# Patient Record
Sex: MALE | Race: WHITE | NOT HISPANIC OR LATINO | Employment: UNEMPLOYED | URBAN - METROPOLITAN AREA
[De-identification: names, ages, dates, MRNs, and addresses within clinical notes are randomized per-mention and may not be internally consistent; named-entity substitution may affect disease eponyms.]

---

## 2020-11-24 ENCOUNTER — LAB VISIT (OUTPATIENT)
Dept: PRIMARY CARE CLINIC | Facility: OTHER | Age: 55
End: 2020-11-24
Attending: INTERNAL MEDICINE
Payer: OTHER GOVERNMENT

## 2020-11-24 DIAGNOSIS — Z03.818 ENCOUNTER FOR OBSERVATION FOR SUSPECTED EXPOSURE TO OTHER BIOLOGICAL AGENTS RULED OUT: ICD-10-CM

## 2020-11-24 PROCEDURE — U0003 INFECTIOUS AGENT DETECTION BY NUCLEIC ACID (DNA OR RNA); SEVERE ACUTE RESPIRATORY SYNDROME CORONAVIRUS 2 (SARS-COV-2) (CORONAVIRUS DISEASE [COVID-19]), AMPLIFIED PROBE TECHNIQUE, MAKING USE OF HIGH THROUGHPUT TECHNOLOGIES AS DESCRIBED BY CMS-2020-01-R: HCPCS

## 2020-11-26 LAB — SARS-COV-2 RNA RESP QL NAA+PROBE: NOT DETECTED

## 2022-06-10 ENCOUNTER — HOSPITAL ENCOUNTER (EMERGENCY)
Facility: HOSPITAL | Age: 57
Discharge: HOME | End: 2022-06-10
Attending: EMERGENCY MEDICINE

## 2022-06-10 VITALS
SYSTOLIC BLOOD PRESSURE: 113 MMHG | BODY MASS INDEX: 24.25 KG/M2 | RESPIRATION RATE: 16 BRPM | DIASTOLIC BLOOD PRESSURE: 77 MMHG | HEART RATE: 59 BPM | OXYGEN SATURATION: 97 % | HEIGHT: 75 IN | WEIGHT: 195 LBS | TEMPERATURE: 98 F

## 2022-06-10 DIAGNOSIS — T16.2XXA FOREIGN BODY OF LEFT EAR, INITIAL ENCOUNTER: Primary | ICD-10-CM

## 2022-06-10 PROCEDURE — 99282 EMERGENCY DEPT VISIT SF MDM: CPT

## 2022-06-10 PROCEDURE — 99282 EMERGENCY DEPT VISIT SF MDM: CPT | Mod: 25,,, | Performed by: EMERGENCY MEDICINE

## 2022-06-10 PROCEDURE — 99282 PR EMERGENCY DEPT VISIT,LEVEL II: ICD-10-PCS | Mod: 25,,, | Performed by: EMERGENCY MEDICINE

## 2022-06-10 PROCEDURE — 69200 CLEAR OUTER EAR CANAL: CPT | Mod: LT,,, | Performed by: EMERGENCY MEDICINE

## 2022-06-10 PROCEDURE — 69200 PR REMV EXT CANAL FOREIGN BODY: ICD-10-PCS | Mod: LT,,, | Performed by: EMERGENCY MEDICINE

## 2022-06-10 NOTE — ED PROVIDER NOTES
Encounter Date: 6/10/2022       History     Chief Complaint   Patient presents with    Otalgia     Pt states a rubber piece from an ear phone is in his left ear; denies any other complaints     57-year-old male presents to the ED for foreign body in left ear.  Patient has a over piece from his headphones stuck in his left ear for 3 days.  Reports trying to get it out with a nail but has been unsuccessful.  Patient denies any changes in hearing, drainage, fevers/chills.        Review of patient's allergies indicates:  No Known Allergies  No past medical history on file.  No past surgical history on file.  No family history on file.     Review of Systems   Constitutional: Negative for chills and fever.   HENT: Negative for sore throat.    Eyes: Negative for pain.   Respiratory: Negative for shortness of breath.    Cardiovascular: Negative for chest pain.   Gastrointestinal: Negative for abdominal pain.   Genitourinary: Negative for difficulty urinating and dysuria.   Musculoskeletal: Negative.    Skin: Negative.    Neurological: Negative for weakness.   Psychiatric/Behavioral: Negative for confusion.       Physical Exam     Initial Vitals [06/10/22 0928]   BP Pulse Resp Temp SpO2   113/77 (!) 59 16 97.9 °F (36.6 °C) 97 %      MAP       --         Physical Exam    Nursing note and vitals reviewed.  Constitutional: He appears well-developed and well-nourished.   HENT:   Head: Normocephalic and atraumatic.   Right Ear: External ear normal.   soft rubber ear piece in the left ear.  Easily removed with tweezers.  TM unremarkable   Eyes: Conjunctivae are normal. Pupils are equal, round, and reactive to light.   Neck: Neck supple.   Normal range of motion.  Cardiovascular: Normal rate, regular rhythm, normal heart sounds and intact distal pulses. Exam reveals no gallop and no friction rub.    No murmur heard.  Pulmonary/Chest: Breath sounds normal.   Abdominal: Abdomen is soft. Bowel sounds are normal. There is no abdominal  tenderness.   Musculoskeletal:         General: Normal range of motion.      Cervical back: Normal range of motion and neck supple.     Neurological: He is alert and oriented to person, place, and time. GCS score is 15. GCS eye subscore is 4. GCS verbal subscore is 5. GCS motor subscore is 6.   Skin: Skin is warm. Capillary refill takes less than 2 seconds.   Psychiatric: He has a normal mood and affect.         ED Course   Procedures  Labs Reviewed - No data to display       Imaging Results    None          Medications - No data to display  Medical Decision Making:   History:   Old Medical Records: I decided to obtain old medical records.       APC / Resident Notes:   57-year-old male presents to the ED for a foreign body in his left ear.  Has a soft piece of rubber from a headphone that was visible next turn or ear canal which was easily removed with tweezers.  The underlying TM unremarkable.  No changes in hearing.  No signs of otitis externa.  Patient is nontoxic appearing with reassuring vitals.                 Clinical Impression:   Final diagnoses:  [T16.2XXA] Foreign body of left ear, initial encounter (Primary)          ED Disposition Condition    Discharge Stable        ED Prescriptions     None        Follow-up Information    None          Sofía Castellanos PA-C  06/10/22 1012

## 2022-06-10 NOTE — ED NOTES
LOC: The patient is awake and alert; oriented x 3 and speaking appropriately.  APPEARANCE: Patient resting comfortably, patient is clean and well groomed  SKIN: warm and dry, normal skin turgor & moist mucus membranes, skin intact, no breakdown noted.  MUSCULOSKELETAL: Patient moving all extremities well, no obvious swelling or deformities noted  RESPIRATORY: Airway is open and patent,; respirations are spontaneous, normal effort and rate  CARDIAC: Patient has a normal rate, no peripheral edema noted, capillary refill < 3 seconds; No complaints of chest pain   ABDOMEN: Soft and non tender to palpation, no distention noted.

## 2022-06-10 NOTE — DISCHARGE INSTRUCTIONS
You were seen in the ED for foreign body in her left ear.  This was removed in the ED.  you may follow-up with your PCP for any other complaints.

## 2022-06-22 ENCOUNTER — HOSPITAL ENCOUNTER (EMERGENCY)
Facility: HOSPITAL | Age: 57
Discharge: HOME | End: 2022-06-22
Attending: EMERGENCY MEDICINE

## 2022-06-22 VITALS
HEIGHT: 75 IN | RESPIRATION RATE: 20 BRPM | SYSTOLIC BLOOD PRESSURE: 130 MMHG | DIASTOLIC BLOOD PRESSURE: 93 MMHG | BODY MASS INDEX: 23.87 KG/M2 | HEART RATE: 85 BPM | TEMPERATURE: 98 F | WEIGHT: 192 LBS

## 2022-06-22 DIAGNOSIS — L23.89 ALLERGIC CONTACT DERMATITIS DUE TO OTHER AGENTS: Primary | ICD-10-CM

## 2022-06-22 DIAGNOSIS — L03.317 CELLULITIS OF RIGHT BUTTOCK: ICD-10-CM

## 2022-06-22 PROCEDURE — 99284 EMERGENCY DEPT VISIT MOD MDM: CPT | Mod: ,,, | Performed by: PHYSICIAN ASSISTANT

## 2022-06-22 PROCEDURE — 99284 PR EMERGENCY DEPT VISIT,LEVEL IV: ICD-10-PCS | Mod: ,,, | Performed by: PHYSICIAN ASSISTANT

## 2022-06-22 PROCEDURE — 99283 EMERGENCY DEPT VISIT LOW MDM: CPT

## 2022-06-22 RX ORDER — CLINDAMYCIN HYDROCHLORIDE 300 MG/1
300 CAPSULE ORAL EVERY 6 HOURS
Qty: 28 CAPSULE | Refills: 0 | Status: SHIPPED | OUTPATIENT
Start: 2022-06-22 | End: 2022-06-29

## 2022-06-22 NOTE — DISCHARGE INSTRUCTIONS
Take clindamycin as prescribed to completion.  Monitor your rash closely.  Follow-up with primary care and Dermatology or return to the emergency department sooner for any new or worsening symptoms.

## 2022-06-22 NOTE — ED PROVIDER NOTES
Encounter Date: 6/22/2022       History     Chief Complaint   Patient presents with    Rash     R buttock and L arm     57-year-old male with past medical history of hypertension presents to the emergency department with chief complaint of rash noted to the left hand and right buttock. He reports this has been intermittent for the last two years, but has recently flared for the last few days. He does admit to using a new soap prior to onset of symptoms. Denies fever, recent illness, recent travel. Denies cp, sob, NVD. He reports trying fungal foot cream in the past with resolution of symptoms, however this has not worked with this flare up. He denies other worsening or alleviating factors.         Review of patient's allergies indicates:  No Known Allergies  Past Medical History:   Diagnosis Date    Arthritis     Hypertension      No past surgical history on file.  No family history on file.  Social History     Tobacco Use    Smoking status: Current Every Day Smoker     Types: Cigarettes   Substance Use Topics    Alcohol use: Yes    Drug use: Yes     Types: Marijuana     Review of Systems   Constitutional: Negative for fever.   HENT: Negative for sore throat.    Respiratory: Negative for shortness of breath.    Cardiovascular: Negative for chest pain.   Gastrointestinal: Negative for nausea.   Genitourinary: Negative for dysuria.   Musculoskeletal: Negative for back pain.   Skin: Positive for rash.   Neurological: Negative for weakness.   Hematological: Does not bruise/bleed easily.       Physical Exam     Initial Vitals [06/22/22 1515]   BP Pulse Resp Temp SpO2   (!) 130/93 85 20 97.9 °F (36.6 °C) --      MAP       --         Physical Exam    Nursing note and vitals reviewed.  Constitutional: He appears well-developed and well-nourished. He is not diaphoretic. No distress.   HENT:   Head: Normocephalic and atraumatic.   Mouth/Throat: Oropharynx is clear and moist.   Eyes: EOM are normal. Pupils are equal,  round, and reactive to light.   Neck: Neck supple.   Normal range of motion.  Cardiovascular: Normal rate, regular rhythm, normal heart sounds and intact distal pulses. Exam reveals no gallop and no friction rub.    No murmur heard.  Pulmonary/Chest: Breath sounds normal. He has no wheezes. He has no rhonchi. He has no rales.   Abdominal: Abdomen is soft. Bowel sounds are normal. There is no abdominal tenderness.   Musculoskeletal:         General: Normal range of motion.      Cervical back: Normal range of motion and neck supple.     Neurological: He is alert and oriented to person, place, and time. He has normal strength. GCS score is 15. GCS eye subscore is 4. GCS verbal subscore is 5. GCS motor subscore is 6.   Skin: Skin is warm and dry. Capillary refill takes less than 2 seconds.   Papular rash noted to the lateral, dorsal aspect to the left hand with some scaling and excoriations.   Diffuse maculopapular erythematous rash located to the right gluteal region. Appears cellulitic. Does not involve the scrotum or perineum.    Psychiatric: He has a normal mood and affect. His behavior is normal. Judgment and thought content normal.         ED Course   Procedures  Labs Reviewed - No data to display       Imaging Results    None          Medications - No data to display  Medical Decision Making:   History:   Old Medical Records: I decided to obtain old medical records.  Initial Assessment:   Emergent evaluation of a 57-year-old male who presents to the emergency department with chief complaint of rash noted to the left hand and right buttock.  Admits to intermittent rashes this nature for the last 2 years.  He does report using a new soap prior to this flare up.  Denies fever or chills.  No recent illness.  Patient is afebrile, hemodynamically stable, nontoxic appearing.  Differential Diagnosis:   Differential diagnosis includes but is not limited to contact dermatitis, cellulitis, monkey pox, herpes zoster.  ED  Management:  Suspect symptoms may be related to contact dermatitis with patient's new soap.  However, he appears to have a superimposed bacterial infection to the right buttock.  Will treat with clindamycin as patient does report that he has had MRSA infections in the past.  Extensive return precautions discussed.  Patient voices understanding.  All questions answered.  The patient was instructed to follow up with dermatology or to return to the emergency department for worsening symptoms. The treatment plan was discussed with the patient who demonstrated understanding and comfort with plan. The patient's history, physical exam, and plan of care was discussed with and agreed upon with my supervising physician.                         Clinical Impression:   Final diagnoses:  [L23.89] Allergic contact dermatitis due to other agents (Primary)  [L03.317] Cellulitis of right buttock          ED Disposition Condition    Discharge Stable        ED Prescriptions     Medication Sig Dispense Start Date End Date Auth. Provider    clindamycin (CLEOCIN) 300 MG capsule Take 1 capsule (300 mg total) by mouth every 6 (six) hours. for 7 days 28 capsule 6/22/2022 6/29/2022 Mel Mcgovern PA-C        Follow-up Information     Follow up With Specialties Details Why Contact Info Additional Information    [facility] - Emergency Dept Emergency Medicine Go to  If symptoms worsen [address] [facility] [id number] [phone]     [facility] - Dermatology [address] [place] [address] Dermatology Schedule an appointment as soon as possible for a visit in 1 week  [address] [facility] [id number] [phone] Dermatology - Main Building, Clinic 11th Floor Please park in [facility]. Use Clinic elevators 12 & 13 to get to the 11th floor    [facility] - Allergy & Dermatology Allergy, Dermatology Schedule an appointment as soon as possible for a visit in 1 week  [address] [facility]  [address]  [phone]       [facility] Int Med Primary Care Bldg Internal Medicine Schedule an appointment as soon as possible for a visit in 1 week  [address] [facility]  [facility] [phone] [facility] Please park in surface lot and check in at central registration desk           Mel Mcgovern PA-C  06/22/22 8468

## 2022-12-27 ENCOUNTER — HOSPITAL ENCOUNTER (OUTPATIENT)
Facility: HOSPITAL | Age: 57
Discharge: LEFT BEFORE DISCHARGE | End: 2022-12-27
Attending: EMERGENCY MEDICINE | Admitting: EMERGENCY MEDICINE
Payer: MEDICAID

## 2022-12-27 VITALS
SYSTOLIC BLOOD PRESSURE: 180 MMHG | RESPIRATION RATE: 18 BRPM | HEART RATE: 67 BPM | OXYGEN SATURATION: 98 % | TEMPERATURE: 98 F | DIASTOLIC BLOOD PRESSURE: 85 MMHG

## 2022-12-27 DIAGNOSIS — T40.604A OPIATE OVERDOSE, UNDETERMINED INTENT, INITIAL ENCOUNTER: Primary | ICD-10-CM

## 2022-12-27 DIAGNOSIS — T68.XXXA HYPOTHERMIA, INITIAL ENCOUNTER: ICD-10-CM

## 2022-12-27 DIAGNOSIS — R00.1 BRADYCARDIA: ICD-10-CM

## 2022-12-27 LAB
ALBUMIN SERPL BCP-MCNC: 3.8 G/DL (ref 3.5–5.2)
ALP SERPL-CCNC: 92 U/L (ref 55–135)
ALT SERPL W/O P-5'-P-CCNC: 34 U/L (ref 10–44)
AMPHET+METHAMPHET UR QL: ABNORMAL
ANION GAP SERPL CALC-SCNC: 13 MMOL/L (ref 8–16)
AST SERPL-CCNC: 37 U/L (ref 10–40)
BARBITURATES UR QL SCN>200 NG/ML: NEGATIVE
BASOPHILS # BLD AUTO: 0.05 K/UL (ref 0–0.2)
BASOPHILS NFR BLD: 0.7 % (ref 0–1.9)
BENZODIAZ UR QL SCN>200 NG/ML: NEGATIVE
BILIRUB SERPL-MCNC: 0.6 MG/DL (ref 0.1–1)
BILIRUB UR QL STRIP: NEGATIVE
BUN SERPL-MCNC: 16 MG/DL (ref 6–20)
BUN SERPL-MCNC: 18 MG/DL (ref 6–30)
BZE UR QL SCN: ABNORMAL
CALCIUM SERPL-MCNC: 9.2 MG/DL (ref 8.7–10.5)
CANNABINOIDS UR QL SCN: ABNORMAL
CHLORIDE SERPL-SCNC: 102 MMOL/L (ref 95–110)
CHLORIDE SERPL-SCNC: 104 MMOL/L (ref 95–110)
CK SERPL-CCNC: 167 U/L (ref 20–200)
CLARITY UR REFRACT.AUTO: CLEAR
CO2 SERPL-SCNC: 24 MMOL/L (ref 23–29)
COLOR UR AUTO: YELLOW
CREAT SERPL-MCNC: 0.9 MG/DL (ref 0.5–1.4)
CREAT SERPL-MCNC: 1 MG/DL (ref 0.5–1.4)
CREAT UR-MCNC: 128 MG/DL (ref 23–375)
DIFFERENTIAL METHOD: ABNORMAL
EOSINOPHIL # BLD AUTO: 0.2 K/UL (ref 0–0.5)
EOSINOPHIL NFR BLD: 2.9 % (ref 0–8)
ERYTHROCYTE [DISTWIDTH] IN BLOOD BY AUTOMATED COUNT: 13.3 % (ref 11.5–14.5)
EST. GFR  (NO RACE VARIABLE): >60 ML/MIN/1.73 M^2
ETHANOL SERPL-MCNC: <10 MG/DL
GLUCOSE SERPL-MCNC: 93 MG/DL (ref 70–110)
GLUCOSE SERPL-MCNC: 97 MG/DL (ref 70–110)
GLUCOSE UR QL STRIP: NEGATIVE
HCT VFR BLD AUTO: 43.8 % (ref 40–54)
HCT VFR BLD CALC: 44 %PCV (ref 36–54)
HCV AB SERPL QL IA: REACTIVE
HCV RNA SERPL NAA+PROBE-LOG IU: 5.19 LOGIU/ML
HCV RNA SERPL QL NAA+PROBE: DETECTED
HCV RNA SPEC NAA+PROBE-ACNC: ABNORMAL IU/ML
HGB BLD-MCNC: 14.6 G/DL (ref 14–18)
HGB UR QL STRIP: NEGATIVE
HIV 1+2 AB+HIV1 P24 AG SERPL QL IA: NORMAL
IMM GRANULOCYTES # BLD AUTO: 0.03 K/UL (ref 0–0.04)
IMM GRANULOCYTES NFR BLD AUTO: 0.4 % (ref 0–0.5)
KETONES UR QL STRIP: ABNORMAL
LEUKOCYTE ESTERASE UR QL STRIP: NEGATIVE
LIPASE SERPL-CCNC: 67 U/L (ref 4–60)
LYMPHOCYTES # BLD AUTO: 2.1 K/UL (ref 1–4.8)
LYMPHOCYTES NFR BLD: 27.9 % (ref 18–48)
MCH RBC QN AUTO: 31.1 PG (ref 27–31)
MCHC RBC AUTO-ENTMCNC: 33.3 G/DL (ref 32–36)
MCV RBC AUTO: 93 FL (ref 82–98)
METHADONE UR QL SCN>300 NG/ML: NEGATIVE
MONOCYTES # BLD AUTO: 0.5 K/UL (ref 0.3–1)
MONOCYTES NFR BLD: 7.1 % (ref 4–15)
NEUTROPHILS # BLD AUTO: 4.5 K/UL (ref 1.8–7.7)
NEUTROPHILS NFR BLD: 61 % (ref 38–73)
NITRITE UR QL STRIP: NEGATIVE
NRBC BLD-RTO: 0 /100 WBC
OPIATES UR QL SCN: NEGATIVE
PCP UR QL SCN>25 NG/ML: NEGATIVE
PH UR STRIP: 6 [PH] (ref 5–8)
PLATELET # BLD AUTO: 265 K/UL (ref 150–450)
PMV BLD AUTO: 8.8 FL (ref 9.2–12.9)
POC IONIZED CALCIUM: 1.14 MMOL/L (ref 1.06–1.42)
POC TCO2 (MEASURED): 27 MMOL/L (ref 23–29)
POTASSIUM BLD-SCNC: 3.5 MMOL/L (ref 3.5–5.1)
POTASSIUM SERPL-SCNC: 3.6 MMOL/L (ref 3.5–5.1)
PROT SERPL-MCNC: 7.5 G/DL (ref 6–8.4)
PROT UR QL STRIP: ABNORMAL
RBC # BLD AUTO: 4.69 M/UL (ref 4.6–6.2)
SAMPLE: NORMAL
SODIUM BLD-SCNC: 140 MMOL/L (ref 136–145)
SODIUM SERPL-SCNC: 141 MMOL/L (ref 136–145)
SP GR UR STRIP: 1.02 (ref 1–1.03)
T4 FREE SERPL-MCNC: 0.94 NG/DL (ref 0.71–1.51)
TOXICOLOGY INFORMATION: ABNORMAL
TSH SERPL DL<=0.005 MIU/L-ACNC: 4.87 UIU/ML (ref 0.4–4)
URN SPEC COLLECT METH UR: ABNORMAL
WBC # BLD AUTO: 7.36 K/UL (ref 3.9–12.7)

## 2022-12-27 PROCEDURE — G0378 HOSPITAL OBSERVATION PER HR: HCPCS

## 2022-12-27 PROCEDURE — 84439 ASSAY OF FREE THYROXINE: CPT | Performed by: EMERGENCY MEDICINE

## 2022-12-27 PROCEDURE — 99285 PR EMERGENCY DEPT VISIT,LEVEL V: ICD-10-PCS | Mod: ,,, | Performed by: EMERGENCY MEDICINE

## 2022-12-27 PROCEDURE — 93010 ELECTROCARDIOGRAM REPORT: CPT | Mod: ,,, | Performed by: INTERNAL MEDICINE

## 2022-12-27 PROCEDURE — 99220 PR INITIAL OBSERVATION CARE,LEVL III: CPT | Mod: ,,, | Performed by: FAMILY MEDICINE

## 2022-12-27 PROCEDURE — 80047 BASIC METABLC PNL IONIZED CA: CPT

## 2022-12-27 PROCEDURE — 99220 PR INITIAL OBSERVATION CARE,LEVL III: ICD-10-PCS | Mod: ,,, | Performed by: FAMILY MEDICINE

## 2022-12-27 PROCEDURE — 83690 ASSAY OF LIPASE: CPT | Performed by: EMERGENCY MEDICINE

## 2022-12-27 PROCEDURE — 99285 EMERGENCY DEPT VISIT HI MDM: CPT | Mod: 25

## 2022-12-27 PROCEDURE — 99285 EMERGENCY DEPT VISIT HI MDM: CPT | Mod: ,,, | Performed by: EMERGENCY MEDICINE

## 2022-12-27 PROCEDURE — 87522 HEPATITIS C REVRS TRNSCRPJ: CPT | Performed by: EMERGENCY MEDICINE

## 2022-12-27 PROCEDURE — 82550 ASSAY OF CK (CPK): CPT | Performed by: EMERGENCY MEDICINE

## 2022-12-27 PROCEDURE — 93010 EKG 12-LEAD: ICD-10-PCS | Mod: ,,, | Performed by: INTERNAL MEDICINE

## 2022-12-27 PROCEDURE — 86803 HEPATITIS C AB TEST: CPT | Performed by: EMERGENCY MEDICINE

## 2022-12-27 PROCEDURE — 87522 HEPATITIS C REVRS TRNSCRPJ: CPT | Mod: 91 | Performed by: EMERGENCY MEDICINE

## 2022-12-27 PROCEDURE — 80307 DRUG TEST PRSMV CHEM ANLYZR: CPT | Performed by: FAMILY MEDICINE

## 2022-12-27 PROCEDURE — 82330 ASSAY OF CALCIUM: CPT

## 2022-12-27 PROCEDURE — 82565 ASSAY OF CREATININE: CPT

## 2022-12-27 PROCEDURE — 85025 COMPLETE CBC W/AUTO DIFF WBC: CPT | Performed by: EMERGENCY MEDICINE

## 2022-12-27 PROCEDURE — 82962 GLUCOSE BLOOD TEST: CPT

## 2022-12-27 PROCEDURE — 81003 URINALYSIS AUTO W/O SCOPE: CPT | Mod: 59 | Performed by: FAMILY MEDICINE

## 2022-12-27 PROCEDURE — 93005 ELECTROCARDIOGRAM TRACING: CPT

## 2022-12-27 PROCEDURE — 87389 HIV-1 AG W/HIV-1&-2 AB AG IA: CPT | Performed by: EMERGENCY MEDICINE

## 2022-12-27 PROCEDURE — 82077 ASSAY SPEC XCP UR&BREATH IA: CPT | Performed by: EMERGENCY MEDICINE

## 2022-12-27 PROCEDURE — 84443 ASSAY THYROID STIM HORMONE: CPT | Performed by: EMERGENCY MEDICINE

## 2022-12-27 PROCEDURE — 80053 COMPREHEN METABOLIC PANEL: CPT | Performed by: EMERGENCY MEDICINE

## 2022-12-27 RX ORDER — TALC
6 POWDER (GRAM) TOPICAL NIGHTLY PRN
Status: DISCONTINUED | OUTPATIENT
Start: 2022-12-27 | End: 2022-12-27 | Stop reason: HOSPADM

## 2022-12-27 RX ORDER — HYDROCODONE BITARTRATE AND ACETAMINOPHEN 5; 325 MG/1; MG/1
1 TABLET ORAL EVERY 6 HOURS PRN
Status: DISCONTINUED | OUTPATIENT
Start: 2022-12-27 | End: 2022-12-27 | Stop reason: HOSPADM

## 2022-12-27 RX ORDER — HYDROXYZINE PAMOATE 25 MG/1
25 CAPSULE ORAL EVERY 8 HOURS PRN
Status: DISCONTINUED | OUTPATIENT
Start: 2022-12-27 | End: 2022-12-27 | Stop reason: HOSPADM

## 2022-12-27 RX ORDER — SODIUM CHLORIDE 0.9 % (FLUSH) 0.9 %
10 SYRINGE (ML) INJECTION
Status: DISCONTINUED | OUTPATIENT
Start: 2022-12-27 | End: 2022-12-27 | Stop reason: HOSPADM

## 2022-12-27 RX ORDER — DILTIAZEM HYDROCHLORIDE 120 MG/1
120 CAPSULE, COATED, EXTENDED RELEASE ORAL DAILY
Status: DISCONTINUED | OUTPATIENT
Start: 2022-12-27 | End: 2022-12-27 | Stop reason: HOSPADM

## 2022-12-27 RX ORDER — ONDANSETRON 2 MG/ML
8 INJECTION INTRAMUSCULAR; INTRAVENOUS EVERY 8 HOURS PRN
Status: DISCONTINUED | OUTPATIENT
Start: 2022-12-27 | End: 2022-12-27 | Stop reason: HOSPADM

## 2022-12-27 RX ORDER — ACETAMINOPHEN 500 MG
1000 TABLET ORAL EVERY 8 HOURS PRN
Status: DISCONTINUED | OUTPATIENT
Start: 2022-12-27 | End: 2022-12-27 | Stop reason: HOSPADM

## 2022-12-27 NOTE — ED PROVIDER NOTES
Encounter Date: 2022       History     Chief Complaint   Patient presents with    Drug Overdose     Found unresponsive at home. +response to 3mg Narcan     HPI    This is a 57-year-old man who is brought in by EMS after being found unresponsive at home.  It concerned person (neighbor versus friend, EMS is unsure) called EMS after breaking into the patient's home to check on him, and found him unresponsive, they thought he had .  EMS found the patient agonal breathing but with a pulse.  He was given 2 mg of intranasal Narcan 1 mg of IV Narcan with return of consciousness.  He did transiently require BVM.  Glucose was 130 with EMS.  History is limited due to patient's altered mental status.  Review of patient's allergies indicates:  No Known Allergies  No past medical history on file.  No past surgical history on file.  No family history on file.     Review of Systems   Unable to perform ROS: Mental status change     Physical Exam     Initial Vitals [22 0303]   BP Pulse Resp Temp SpO2   (!) 194/111 66 12 (!) 94 °F (34.4 °C) 100 %      MAP       --         Physical Exam  Gen:  Eyes closed, arousable to voice, oriented x3 but falls asleep very easily if not directly stimulated, can not answer questions, NAD, disheveled  Eye: EOMI, no scleral icterus, no periorbital edema or ecchymosis  Head: normocephalic, atraumatic, no lesions, scalp appears normal  ENT: neck supple, no stridor, no masses, no drooling or voice changes  CVS: RRR, no m/r/g, distal pulses intact/symmetric  Pulm: CTAB, no wheezes, rales or rhonchi, no increased work of breathing  Abd: soft, nontender, nondistended, no organomegaly, no CVAT  Ext: no edema, no lesions, rashes, or deformity  Neuro: GCS14, moving all extremities, face grossly symmetric  Psych:  Unable to assess    ED Course   Procedures  Labs Reviewed   CBC W/ AUTO DIFFERENTIAL - Abnormal; Notable for the following components:       Result Value    MCH 31.1 (*)     MPV 8.8 (*)      All other components within normal limits    Narrative:     Release to patient->Immediate   LIPASE - Abnormal; Notable for the following components:    Lipase 67 (*)     All other components within normal limits    Narrative:     Release to patient->Immediate   HEPATITIS C ANTIBODY - Abnormal; Notable for the following components:    Hepatitis C Ab Reactive (*)     All other components within normal limits    Narrative:     Release to patient->Immediate   COMPREHENSIVE METABOLIC PANEL    Narrative:     Release to patient->Immediate   CK    Narrative:     Release to patient->Immediate   ALCOHOL,MEDICAL (ETHANOL)    Narrative:     Release to patient->Immediate   HIV 1 / 2 ANTIBODY    Narrative:     Release to patient->Immediate   URINALYSIS, REFLEX TO URINE CULTURE   DRUG SCREEN PANEL, URINE EMERGENCY   ISTAT PROCEDURE   ISTAT CHEM8          Imaging Results              CT Head Without Contrast (Final result)  Result time 12/27/22 04:41:21      Final result by El Hermosillo MD (12/27/22 04:41:21)                   Impression:      No definite CT evidence of acute intracranial abnormality.  If there is persistent concern for global hypoxic ischemic injury, more sensitive assessment could be performed with MRI if there are no patient contraindications.    Mild chronic senescent and microvascular ischemic changes.    Electronically signed by resident: Jose Luis Pereira  Date:    12/27/2022  Time:    04:08    Electronically signed by: El Hermosillo MD  Date:    12/27/2022  Time:    04:41               Narrative:    EXAMINATION:  CT HEAD WITHOUT CONTRAST    CLINICAL HISTORY:  Mental status change, unknown cause;    TECHNIQUE:  Low dose axial CT images obtained throughout the head without the use of intravenous contrast.  Axial, sagittal and coronal reconstructions were performed.    COMPARISON:  None    FINDINGS:  Please note image quality is degraded by streak artifact, particularly limiting assessment at the skull  base.    There is mild generalized cerebral volume loss.  There is mild periventricular white matter hypoattenuation and sequelae of chronic microvascular ischemic change.  Gray-white matter differentiation appears maintained.  There is no evidence of acute intracranial hemorrhage or midline shift.  No evidence of hydrocephalus.  No extra-axial collections appreciated.  Basal cisterns are patent.    There is trace mucosal thickening of the bilateral maxillary sinuses, left more so than right.  The remaining paranasal sinuses and mastoid air cells appear well aerated.  No depressed calvarial fracture identified to                                       Medications - No data to display  Medical Decision Making:   History:   Old Medical Records: I decided to obtain old medical records.  Old Records Summarized: records from clinic visits.  Initial Assessment:   This is a 57-year-old man who presents after being found down, unresponsive, responded to Narcan.  He is hypothermic and bradycardic, I am concerned about prolonged downtime, rhabdomyolysis, electrolyte abnormality, versus less likely anoxic brain injury.  Plan for broad labs, CK, EKG and close monitoring of his temperature.  Clinical Tests:   Lab Tests: Ordered and Reviewed  Radiological Study: Ordered and Reviewed  ED Management:  CT head by my independent interpretation is negative for acute bleed or other obvious explanation for the patient's presentation.  His labs are overall reassuring, though urine studies are pending.  His lipase is slightly elevated, but I do not think it is consistent with pancreatitis.  On reassessment, he is still fairly sleepy, and hypothermic, with active rewarming in process.  Care passed off to the morning attending, awaiting reassessment after continued warming.                        Clinical Impression:   Final diagnoses:  [R00.1] Bradycardia  [T40.604A] Opiate overdose, undetermined intent, initial encounter  (Primary)  [T68.XXXA] Hypothermia, initial encounter               Olamide Calles MD  12/27/22 0550

## 2022-12-27 NOTE — ED NOTES
Patient resting in stretcher and is in NAD at this time. Pt is asleep, rise and fall of chest noted, skin diaphoretic, on bear hugger.  Pt denies pain at this time. Pt updated on POC. Bed low and locked, SR up x2, call bell in patient reach. Pt remains on continuous cardiac monitor, continuous pulse ox, and auto BP cuff. Pt voices no needs at this time.

## 2022-12-27 NOTE — ED TRIAGE NOTES
57 year old male presents to the ED via EMS after being found unresponsive. Was found with sonorus respirations and pinpoint pupils. Initially given 2mg IN narcan with little response. Then given 1mg IV narcan. Patient awoke and remained responsive to voice.

## 2022-12-27 NOTE — PROVIDER PROGRESS NOTES - EMERGENCY DEPT.
Encounter Date: 12/27/2022    ED Physician Progress Notes        Physician Note:   Received sign-out on patient at 6:00 a.m.  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, works construction, does daily renovation of a school  He does admit to a significant methamphetamine habit/addiction and says that he did use last night but denies history of opioid abuse or use    Will continue to monitor closely  If his mental status continues to improve and remainder of lab work unremarkable and vital signs improving hypothermia resolved, I think it would be safe and appropriate for the patient be discharged home, especially now that we are certain that he did use illicit drugs and likely took something that was contaminated with an opioid    10:23 AM  Patient reassessed.  He is still lethargic.  Reports feeling dizzy.  Still hypothermic with a temp around 95.  His blood pressure is elevated however he reports a history of hypertension and does not take any medication this.  He has been observed now for 7 hours and will now plan to admit for observation and further workup.

## 2022-12-27 NOTE — ED NOTES
Telemetry Verification   Patient placed on Telemetry Box  Verified with War Room  Box # [id number]   Monitor Tech Sneha    Rate 68   Rhythm NS

## 2022-12-27 NOTE — PHARMACY MED REC
"    Admission Medication History     The home medication history was taken by Edwin Montiel.    You may go to "Admission" then "Reconcile Home Medications" tabs to review and/or act upon these items.     The home medication list has been updated by the Pharmacy department.   Please read ALL comments highlighted in yellow.   Please address this information as you see fit.    Feel free to contact us if you have any questions or require assistance.      Medications listed below were obtained from: Patient  No current outpatient medications on file prior to encounter.         Edwin Montiel  EXT [id number]              .        " wife

## 2022-12-28 LAB — HCV RNA SERPL NAA+PROBE-ACNC: ABNORMAL IU/ML

## 2022-12-28 NOTE — HOSPITAL COURSE
Patient was given anti hypertensives and IVFs. He felt better in the evening and left the hospital AMA.

## 2022-12-28 NOTE — SUBJECTIVE & OBJECTIVE
No past medical history on file.    No past surgical history on file.    Review of patient's allergies indicates:  No Known Allergies    No current facility-administered medications on file prior to encounter.     No current outpatient medications on file prior to encounter.     Family History    None       Tobacco Use    Smoking status: Not on file    Smokeless tobacco: Not on file   Substance and Sexual Activity    Alcohol use: Not on file    Drug use: Not on file    Sexual activity: Not on file     Review of Systems   Unable to perform ROS: Acuity of condition (limited)   Gastrointestinal:  Positive for nausea.   Objective:     Vital Signs (Most Recent):  Temp: 97.4 °F (36.3 °C) (12/27/22 1335)  Pulse: 67 (12/27/22 1700)  Resp: 18 (12/27/22 1335)  BP: (!) 191/105 (12/27/22 1700)  SpO2: 98 % (12/27/22 1700)   Vital Signs (24h Range):  Temp:  [94 °F (34.4 °C)-97.4 °F (36.3 °C)] 97.4 °F (36.3 °C)  Pulse:  [41-72] 67  Resp:  [12-24] 18  SpO2:  [91 %-100 %] 98 %  BP: (150-204)/() 191/105        There is no height or weight on file to calculate BMI.    Physical Exam  Vitals and nursing note reviewed.   Constitutional:       General: He is not in acute distress.     Appearance: Normal appearance. He is well-developed. He is not ill-appearing or toxic-appearing.      Comments: unkempt   HENT:      Head: Normocephalic and atraumatic.      Right Ear: External ear normal.      Left Ear: External ear normal.      Nose: Nose normal.      Mouth/Throat:      Pharynx: Uvula midline.   Eyes:      General: Lids are normal.      Extraocular Movements: EOM normal.      Conjunctiva/sclera: Conjunctivae normal.   Neck:      Thyroid: No thyroid mass.      Vascular: No JVD.      Trachea: Trachea normal.   Cardiovascular:      Rate and Rhythm: Normal rate and regular rhythm.      Heart sounds: Normal heart sounds, S1 normal and S2 normal.   Pulmonary:      Effort: Pulmonary effort is normal.      Breath sounds: Normal breath  sounds.   Abdominal:      General: Bowel sounds are normal. There is no distension.      Palpations: Abdomen is soft.      Tenderness: There is no abdominal tenderness.   Musculoskeletal:      Cervical back: Full passive range of motion without pain, normal range of motion and neck supple.      Right lower leg: No edema.      Left lower leg: No edema.   Skin:     General: Skin is warm.   Neurological:      Mental Status: He is easily aroused. He is lethargic and disoriented.      Cranial Nerves: No cranial nerve deficit.      Sensory: No sensory deficit.   Psychiatric:         Speech: Speech is delayed.         Behavior: Behavior is slowed and withdrawn. Behavior is cooperative.         Thought Content: Thought content normal.         Cognition and Memory: Cognition is impaired. Memory is impaired.         CRANIAL NERVES     CN III, IV, VI   Extraocular motions are normal.      Significant Labs: All pertinent labs within the past 24 hours have been reviewed.  Blood Culture: No results for input(s): LABBLOO in the last 48 hours.  CBC:   Recent Labs   Lab 12/27/22  0407 12/27/22  0411   WBC 7.36  --    HGB 14.6  --    HCT 43.8 44     --      CMP:   Recent Labs   Lab 12/27/22  0407      K 3.6      CO2 24   GLU 93   BUN 16   CREATININE 0.9   CALCIUM 9.2   PROT 7.5   ALBUMIN 3.8   BILITOT 0.6   ALKPHOS 92   AST 37   ALT 34   ANIONGAP 13     Lactic Acid: No results for input(s): LACTATE in the last 48 hours.  Lipase:   Recent Labs   Lab 12/27/22  0407   LIPASE 67*     Magnesium: No results for input(s): MG in the last 48 hours.  Urine Studies:   Recent Labs   Lab 12/27/22  1337   COLORU Yellow   APPEARANCEUA Clear   PHUR 6.0   SPECGRAV 1.025   PROTEINUA Trace*   GLUCUA Negative   KETONESU Trace*   BILIRUBINUA Negative   OCCULTUA Negative   NITRITE Negative   LEUKOCYTESUR Negative       Significant Imaging: I have reviewed all pertinent imaging results/findings within the past 24 hours.

## 2022-12-28 NOTE — HPI
Per ED provider:  This is a 57-year-old man who is brought in by EMS after being found unresponsive at home.  It concerned person (neighbor versus friend, EMS is unsure) called EMS after breaking into the patient's home to check on him, and found him unresponsive, they thought he had .  EMS found the patient agonal breathing but with a pulse.  He was given 2 mg of intranasal Narcan 1 mg of IV Narcan with return of consciousness.  He did transiently require BVM.  Glucose was 130 with EMS. History is limited due to patient's altered mental status.     Upon my bedside evaluation, he is lethargic and easily falls asleep. Took oral temp and it was 94.2, axillary temp 93.4 despite warm body/ extremities. He is having nausea and actively starts to vomit.     In the ED, lab work unremarkable. Remains hypothermic despite 7 hours of observation.  consulted for admission.

## 2022-12-28 NOTE — CARE UPDATE
Nursing notified me that patient is wanting to leave AMA. Patient removed IV and left prior to me arriving.

## 2022-12-28 NOTE — H&P
[facility] Medicine  History & Physical    Patient Name: Ramez Rand  MRN: [id number]  Patient Class: OP- Observation  Admission Date: 2022  Attending Physician: Ibeth Baeza MD  Primary Care Provider: No primary care provider on file.      Patient information was obtained from past medical records and ER records.     Subjective:     Principal Problem:Hypothermia    Chief Complaint:   Chief Complaint   Patient presents with    Drug Overdose     Found unresponsive at home. +response to 3mg Narcan        HPI: Per ED provider:  This is a 57-year-old man who is brought in by EMS after being found unresponsive at home.  It concerned person (neighbor versus friend, EMS is unsure) called EMS after breaking into the patient's home to check on him, and found him unresponsive, they thought he had .  EMS found the patient agonal breathing but with a pulse.  He was given 2 mg of intranasal Narcan 1 mg of IV Narcan with return of consciousness.  He did transiently require BVM.  Glucose was 130 with EMS. History is limited due to patient's altered mental status.     Upon my bedside evaluation, he is lethargic and easily falls asleep. Took oral temp and it was 94.2, axillary temp 93.4 despite warm body/ extremities. He is having nausea and actively starts to vomit.     In the ED, lab work unremarkable. Remains hypothermic despite 7 hours of observation.  consulted for admission.       No past medical history on file.    No past surgical history on file.    Review of patient's allergies indicates:  No Known Allergies    No current facility-administered medications on file prior to encounter.     No current outpatient medications on file prior to encounter.     Family History    None       Tobacco Use    Smoking status: Not on file    Smokeless tobacco: Not on file   Substance and Sexual Activity    Alcohol use: Not on file    Drug use: Not on file    Sexual activity: Not on file      Review of Systems   Unable to perform ROS: Acuity of condition (limited)   Gastrointestinal:  Positive for nausea.   Objective:     Vital Signs (Most Recent):  Temp: 97.4 °F (36.3 °C) (12/27/22 1335)  Pulse: 67 (12/27/22 1700)  Resp: 18 (12/27/22 1335)  BP: (!) 191/105 (12/27/22 1700)  SpO2: 98 % (12/27/22 1700)   Vital Signs (24h Range):  Temp:  [94 °F (34.4 °C)-97.4 °F (36.3 °C)] 97.4 °F (36.3 °C)  Pulse:  [41-72] 67  Resp:  [12-24] 18  SpO2:  [91 %-100 %] 98 %  BP: (150-204)/() 191/105        There is no height or weight on file to calculate BMI.    Physical Exam  Vitals and nursing note reviewed.   Constitutional:       General: He is not in acute distress.     Appearance: Normal appearance. He is well-developed. He is not ill-appearing or toxic-appearing.      Comments: unkempt   HENT:      Head: Normocephalic and atraumatic.      Right Ear: External ear normal.      Left Ear: External ear normal.      Nose: Nose normal.      Mouth/Throat:      Pharynx: Uvula midline.   Eyes:      General: Lids are normal.      Extraocular Movements: EOM normal.      Conjunctiva/sclera: Conjunctivae normal.   Neck:      Thyroid: No thyroid mass.      Vascular: No JVD.      Trachea: Trachea normal.   Cardiovascular:      Rate and Rhythm: Normal rate and regular rhythm.      Heart sounds: Normal heart sounds, S1 normal and S2 normal.   Pulmonary:      Effort: Pulmonary effort is normal.      Breath sounds: Normal breath sounds.   Abdominal:      General: Bowel sounds are normal. There is no distension.      Palpations: Abdomen is soft.      Tenderness: There is no abdominal tenderness.   Musculoskeletal:      Cervical back: Full passive range of motion without pain, normal range of motion and neck supple.      Right lower leg: No edema.      Left lower leg: No edema.   Skin:     General: Skin is warm.   Neurological:      Mental Status: He is easily aroused. He is lethargic and disoriented.      Cranial Nerves: No  cranial nerve deficit.      Sensory: No sensory deficit.   Psychiatric:         Speech: Speech is delayed.         Behavior: Behavior is slowed and withdrawn. Behavior is cooperative.         Thought Content: Thought content normal.         Cognition and Memory: Cognition is impaired. Memory is impaired.         CRANIAL NERVES     CN III, IV, VI   Extraocular motions are normal.      Significant Labs: All pertinent labs within the past 24 hours have been reviewed.  Blood Culture: No results for input(s): LABBLOO in the last 48 hours.  CBC:   Recent Labs   Lab 12/27/22 0407 12/27/22 0411   WBC 7.36  --    HGB 14.6  --    HCT 43.8 44     --      CMP:   Recent Labs   Lab 12/27/22 0407      K 3.6      CO2 24   GLU 93   BUN 16   CREATININE 0.9   CALCIUM 9.2   PROT 7.5   ALBUMIN 3.8   BILITOT 0.6   ALKPHOS 92   AST 37   ALT 34   ANIONGAP 13     Lactic Acid: No results for input(s): LACTATE in the last 48 hours.  Lipase:   Recent Labs   Lab 12/27/22 0407   LIPASE 67*     Magnesium: No results for input(s): MG in the last 48 hours.  Urine Studies:   Recent Labs   Lab 12/27/22  1337   COLORU Yellow   APPEARANCEUA Clear   PHUR 6.0   SPECGRAV 1.025   PROTEINUA Trace*   GLUCUA Negative   KETONESU Trace*   BILIRUBINUA Negative   OCCULTUA Negative   NITRITE Negative   LEUKOCYTESUR Negative       Significant Imaging: I have reviewed all pertinent imaging results/findings within the past 24 hours.    Assessment/Plan:     Hypothermia  - would consider infectious cullen if no improvement this evening. Continue with warming blanket.    Methamphetamine abuse  - UDS positive for cocaine, meth and THC    Elevated BP  - no hx of HTN (per chart review)  Start Diltiazem and monitor.    VTE Risk Mitigation (From admission, onward)         Ordered     IP VTE LOW RISK PATIENT  Once         12/27/22 1045     Place sequential compression device  Until discontinued         12/27/22 1045                 Ibeth Baeza  MD  Department of Hospital Medicine   [facility]

## 2022-12-28 NOTE — DISCHARGE SUMMARY
[facility] Medicine  Discharge Summary      Patient Name: Ramez Rand  MRN: [id number]  WARNER: [id number]  Patient Class: OP- Observation  Admission Date: 2022  Hospital Length of Stay: 0 days  Discharge Date and Time: 2022  9:11 PM  Discharging Provider: Ibeth Baeza MD  Primary Care Provider: No primary care provider on file.  Hospital Medicine Team: Networked reference to record PCT  Ibeth Baeza MD  Primary Care Team: Networked reference to record PCT       HPI:   Per ED provider:  This is a 57-year-old man who is brought in by EMS after being found unresponsive at home.  It concerned person (neighbor versus friend, EMS is unsure) called EMS after breaking into the patient's home to check on him, and found him unresponsive, they thought he had .  EMS found the patient agonal breathing but with a pulse.  He was given 2 mg of intranasal Narcan 1 mg of IV Narcan with return of consciousness.  He did transiently require BVM.  Glucose was 130 with EMS. History is limited due to patient's altered mental status.     Upon my bedside evaluation, he is lethargic and easily falls asleep. Took oral temp and it was 94.2, axillary temp 93.4 despite warm body/ extremities. He is having nausea and actively starts to vomit.     In the ED, lab work unremarkable. Remains hypothermic despite 7 hours of observation.  consulted for admission.       Hospital Course:   Patient was given anti hypertensives and IVFs. He felt better in the evening and left the hospital AMA.      Consults: none    Hypothermia  resolved     Methamphetamine abuse  - UDS positive for cocaine, meth and THC     Elevated BP      Final Active Diagnoses:    Diagnosis Date Noted POA    PRINCIPAL PROBLEM:  Hypothermia [T68.XXXA] 2022 Yes      Problems Resolved During this Admission:       Discharged Condition: against medical advice    Disposition: Left Against Medical Adv*    Follow Up:   Follow-up Information      [facility] - Emergency [facility].    Specialty: Emergency Medicine  Why: If symptoms worsen  Contact information:  [address] [facility]  [facility] [address] [phone]                     Patient Instructions:   No discharge procedures on file.    Significant Diagnostic Studies: Labs:   CMP   Recent Labs   Lab 12/27/22  0407      K 3.6      CO2 24   GLU 93   BUN 16   CREATININE 0.9   CALCIUM 9.2   PROT 7.5   ALBUMIN 3.8   BILITOT 0.6   ALKPHOS 92   AST 37   ALT 34   ANIONGAP 13    and CBC   Recent Labs   Lab 12/27/22  0407 12/27/22  0411   WBC 7.36  --    HGB 14.6  --    HCT 43.8 44     --        Pending Diagnostic Studies:     None         Medications:  None    Indwelling Lines/Drains at time of discharge:   Lines/Drains/Airways     None                 Time spent on the discharge of patient: 10 minutes       Ibeth Baeza MD  Department of Hospital Medicine  [facility] S[facility]

## 2023-07-19 ENCOUNTER — HOSPITAL ENCOUNTER (EMERGENCY)
Facility: HOSPITAL | Age: 58
Discharge: HOME | End: 2023-07-19
Attending: EMERGENCY MEDICINE
Payer: MEDICAID

## 2023-07-19 VITALS
TEMPERATURE: 98 F | DIASTOLIC BLOOD PRESSURE: 102 MMHG | SYSTOLIC BLOOD PRESSURE: 159 MMHG | HEART RATE: 63 BPM | BODY MASS INDEX: 23.25 KG/M2 | HEIGHT: 75 IN | RESPIRATION RATE: 16 BRPM | WEIGHT: 187 LBS | OXYGEN SATURATION: 98 %

## 2023-07-19 DIAGNOSIS — N50.89 SWELLING OF LEFT TESTICLE: ICD-10-CM

## 2023-07-19 DIAGNOSIS — M79.605 LEFT LEG PAIN: Primary | ICD-10-CM

## 2023-07-19 DIAGNOSIS — M17.12 OSTEOARTHRITIS OF LEFT KNEE, UNSPECIFIED OSTEOARTHRITIS TYPE: ICD-10-CM

## 2023-07-19 DIAGNOSIS — R03.0 ELEVATED BLOOD PRESSURE READING: ICD-10-CM

## 2023-07-19 PROCEDURE — 99284 EMERGENCY DEPT VISIT MOD MDM: CPT

## 2023-07-19 PROCEDURE — 63600175 PHARM REV CODE 636 W HCPCS: Performed by: EMERGENCY MEDICINE

## 2023-07-19 PROCEDURE — 96372 THER/PROPH/DIAG INJ SC/IM: CPT | Performed by: EMERGENCY MEDICINE

## 2023-07-19 RX ORDER — KETOROLAC TROMETHAMINE 30 MG/ML
30 INJECTION, SOLUTION INTRAMUSCULAR; INTRAVENOUS
Status: COMPLETED | OUTPATIENT
Start: 2023-07-19 | End: 2023-07-19

## 2023-07-19 RX ADMIN — KETOROLAC TROMETHAMINE 30 MG: 30 INJECTION, SOLUTION INTRAMUSCULAR; INTRAVENOUS at 12:07

## 2023-07-19 NOTE — ED NOTES
Patient reports lm leg pain x 3-4 days, also concerned about linear abrasion to mid neck. Was in ED yesterday and LWBS, reports daily heavy ETOH, last IVDU yesterday, denies use in feet.

## 2023-07-19 NOTE — Clinical Note
"Ramez Arriaga (Curtis)ceferino was seen and treated in our emergency department on 7/19/2023.  He may return to work on 07/20/2023.       If you have any questions or concerns, please don't hesitate to call.      Dinesh Aguillon III, MD"

## 2023-07-19 NOTE — ED NOTES
Bottom of feet covwered in brown hardened substance, able to doppler pedal pulses, right foot sl discolored.

## 2023-07-19 NOTE — DISCHARGE INSTRUCTIONS
For pain:  Take Tylenol 500 mg every 4-6 hours. Take Ibuprofen 600 mg every 6 hours. Alternate the dosing of these medications (don't take them at the same time).    Your left testicle is very swollen. While you did not come to the ER for this, it was discovered on your exam and needs additional evaluation. You have been referred to [facility]. Urologist are specialist who treat conditions involving the urinary tract and the genitals. It is very important that you follow-up with a specialist because testicular swelling could indicate a serious condition such as cancer.    Your blood pressure was elevated today. It is possible that the elevation was due to pain; however, it is important that you follow-up with a primary provider to ensure that you don't have hypertension (chronic high blood pressure) or another underlying condition.    Thank you for allowing us to participate in your healthcare needs. We really appreciate it and hope that the care you received was exemplary and that you'll consider us for any further needs.    - Dr. Aguillon

## 2023-07-19 NOTE — ED NOTES
Patient identifiers verified and correct for Mr Mensah  C/C:  Herrera leg pain, SEE NN  APPEARANCE: awake and alert in NAD. PAIN  7/10  SKIN: warm, dry  area to right outer foot dark color, no open areas,   MUSCULOSKELETAL: Patient moving all extremities spontaneously, no obvious swelling or deformities noted. Ambulates independently.  RESPIRATORY: Denies shortness of breath.Respirations unlabored.   CARDIAC: Denies CP, 2+ distal pulses; no peripheral edema  ABDOMEN: S/ND/NT, Denies nausea  : voids spontaneously, denies difficulty  Neurologic: AAO x 4; follows commands equal strength in all extremities; denies numbness/tingling. Denies dizziness  Denies new wekaness

## 2023-07-19 NOTE — ED PROVIDER NOTES
"Encounter Date: 7/19/2023       History     Chief Complaint   Patient presents with    Leg Problem     Left leg pain x 3 days; "big bump on the back of the knee"     Foot Swelling     Right foot x 1 year swelling and discoloration     Rash     History obtained from the patient without limitations.  58-year-old with the below past medical history complains generalized pain to his left lower extremity for 3 days.  The pain is worse at the knee.  He denies trauma.  He is had similar pain intermittently for years.  He also complains intermittent swelling and discoloration to his right foot for over a year.  The pain to his left leg is worsened by standing and walking.  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  He has not taken over-the-counter analgesics or attempted nonmedicinal treatment modalities.  He has been a pack-a-day smoker since age 9.  He denies weakness and numbness to all body areas.  He denies back pain.  He denies abdominal pain.  He denies fever, chills, nausea, and vomiting.  He denies dysuria, gross hematuria, and difficulty urinating.      Review of patient's allergies indicates:  No Known Allergies  Past Medical History:   Diagnosis Date    Arthritis     Hypertension      History reviewed. No pertinent surgical history.  History reviewed. No pertinent family history.  Social History     Tobacco Use    Smoking status: Every Day     Packs/day: 1.00     Types: Cigarettes   Substance Use Topics    Alcohol use: Yes     Comment: Daily beer and whiskkey    Drug use: Yes     Types: Marijuana, IV     Comment: last use 2 days ago     Review of Systems  See HPI.      Physical Exam     Initial Vitals [07/19/23 1036]   BP Pulse Resp Temp SpO2   (!) 167/102 90 19 98.1 °F (36.7 °C) 96 %      MAP       --         Physical Exam  GENERAL:  Well-appearing.  No acute distress.  HEAD:  Normocephalic.  Atraumatic.  EYES:  Anicteric sclera.  Normal conjunctivae.    ENT:  " Moist mucous membranes.  No pharyngeal erythema or exudates.  CARDIOVASCULAR:  Regular rate and rhythm.  No murmurs, rubs, or gallops.  2+ bilateral dorsalis pedis and posterior tibialis pulses.  RESPIRATORY:  Unlabored.    GASTROINTESTINAL:  Soft, nontender, nondistended.  No masses.  INTEGUMENTARY:  Warm.  Dry. No pallor.  No erythema or rashes to the lower extremities.  MUSCULOSKELETAL:  Left knee is swollen and diffusely tender.  The joint is without abnormal laxity.  The joint does not feel hot.  There are no overlying rashes or lesions.  There is no calf tenderness.  Left knee joint range of motion is diminished secondary to pain.  There is no swelling or tenderness to the right lower extremity.  NEUROLOGICAL:  Awake, alert, and oriented totally.  Normal mentation.  GENITOURINARY:  Normal penis.  The left testicle is very enlarged but nontender.    ED Course   Procedures  Labs Reviewed - No data to display         Imaging Results    None          Medications   ketorolac injection 30 mg (30 mg Intramuscular Given 7/19/23 1239)                 ED Course as of 07/19/23 1624   Wed Jul 19, 2023   1230 Acute left lower extremity pain appears to be musculoskeletal.  The left knee is swollen and tender.  There is low index of suspicion for septic arthritis.  There is no indication for emergent imaging.  The patient likely has osteoarthritis involving this joint.  IM Toradol was administered.  He was instructed to alternate acetaminophen and ibuprofen.  PCP follow-up was advised.  Incidental finding of painless left testicular swelling on exam.  He reported that this has been ongoing for years.  Ambulatory referral to Neurology was submitted for outpatient workup. [LP]      ED Course User Index  [LP] Dinesh Aguillon III, MD                 Clinical Impression:   Final diagnoses:  [M79.605] Left leg pain (Primary)  [M17.12] Osteoarthritis of left knee, unspecified osteoarthritis type  [N50.89] Swelling of left  testicle  [R03.0] Elevated blood pressure reading        ED Disposition Condition    Discharge Stable          ED Prescriptions    None       Follow-up Information       Follow up With Specialties Details Why Contact Info    Your primary care provider   Schedule close follow-up with a primary care provider.     ER   Return to the ER for worsened pain or any immediate concerns.              Dinesh Aguillon III, MD  07/19/23 3227

## 2023-10-10 ENCOUNTER — HOSPITAL ENCOUNTER (INPATIENT)
Facility: HOSPITAL | Age: 58
LOS: 3 days | Discharge: HOME | DRG: 481 | End: 2023-10-13
Attending: EMERGENCY MEDICINE | Admitting: INTERNAL MEDICINE
Payer: MEDICAID

## 2023-10-10 DIAGNOSIS — S72.142A CLOSED DISPLACED INTERTROCHANTERIC FRACTURE OF LEFT FEMUR, INITIAL ENCOUNTER: ICD-10-CM

## 2023-10-10 DIAGNOSIS — S72.145A CLOSED NONDISPLACED INTERTROCHANTERIC FRACTURE OF LEFT FEMUR, INITIAL ENCOUNTER: ICD-10-CM
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
## 2023-10-10 DIAGNOSIS — Z01.810 PREOP CARDIOVASCULAR EXAM: ICD-10-CM

## 2023-10-10 DIAGNOSIS — B18.2 CHRONIC HEPATITIS C WITHOUT HEPATIC COMA: ICD-10-CM

## 2023-10-10 DIAGNOSIS — S72.142A CLOSED 2-PART INTERTROCHANTERIC FRACTURE OF LEFT FEMUR, INITIAL ENCOUNTER: Primary | ICD-10-CM

## 2023-10-10 DIAGNOSIS — S79.912A HIP INJURY, LEFT, INITIAL ENCOUNTER: ICD-10-CM

## 2023-10-10 PROCEDURE — 25000003 PHARM REV CODE 250: Performed by: PHYSICIAN ASSISTANT

## 2023-10-10 PROCEDURE — 99285 EMERGENCY DEPT VISIT HI MDM: CPT

## 2023-10-10 PROCEDURE — 63600175 PHARM REV CODE 636 W HCPCS: Performed by: PHYSICIAN ASSISTANT

## 2023-10-10 PROCEDURE — 12000002 HC ACUTE/MED SURGE SEMI-PRIVATE ROOM

## 2023-10-10 PROCEDURE — 96372 THER/PROPH/DIAG INJ SC/IM: CPT | Performed by: PHYSICIAN ASSISTANT

## 2023-10-10 RX ORDER — OXYCODONE HYDROCHLORIDE 5 MG/1
5 TABLET ORAL
Status: COMPLETED | OUTPATIENT
Start: 2023-10-10 | End: 2023-10-10

## 2023-10-10 RX ORDER — KETOROLAC TROMETHAMINE 30 MG/ML
15 INJECTION, SOLUTION INTRAMUSCULAR; INTRAVENOUS
Status: COMPLETED | OUTPATIENT
Start: 2023-10-10 | End: 2023-10-10

## 2023-10-10 RX ORDER — ACETAMINOPHEN 325 MG/1
650 TABLET ORAL
Status: COMPLETED | OUTPATIENT
Start: 2023-10-10 | End: 2023-10-10

## 2023-10-10 RX ADMIN — OXYCODONE HYDROCHLORIDE 5 MG: 5 TABLET ORAL at 11:10

## 2023-10-10 RX ADMIN — KETOROLAC TROMETHAMINE 15 MG: 30 INJECTION INTRAMUSCULAR; INTRAVENOUS at 08:10

## 2023-10-10 RX ADMIN — ACETAMINOPHEN 650 MG: 325 TABLET ORAL at 08:10

## 2023-10-11 ENCOUNTER — ANESTHESIA (OUTPATIENT)
Dept: SURGERY | Facility: HOSPITAL | Age: 58
DRG: 481 | End: 2023-10-11
Payer: MEDICAID

## 2023-10-11 ENCOUNTER — ANESTHESIA EVENT (OUTPATIENT)
Dept: SURGERY | Facility: HOSPITAL | Age: 58
DRG: 481 | End: 2023-10-11
Payer: MEDICAID

## 2023-10-11 PROBLEM — I10 PRIMARY HYPERTENSION: Status: ACTIVE | Noted: 2023-10-11

## 2023-10-11 PROBLEM — S72.102A CLOSED FRACTURE OF TROCHANTER OF LEFT FEMUR: Status: ACTIVE | Noted: 2023-10-11

## 2023-10-11 PROBLEM — F10.10 ALCOHOL ABUSE, UNCOMPLICATED: Status: ACTIVE | Noted: 2023-10-11
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
## 2023-10-11 PROBLEM — E87.1 HYPONATREMIA: Status: ACTIVE | Noted: 2023-10-11

## 2023-10-11 PROBLEM — S72.145A CLOSED NONDISPLACED INTERTROCHANTERIC FRACTURE OF LEFT FEMUR: Status: ACTIVE | Noted: 2023-10-11

## 2023-10-11 PROBLEM — E55.9 HYPOVITAMINOSIS D: Status: ACTIVE | Noted: 2023-10-11

## 2023-10-11 PROBLEM — Z72.0 TOBACCO ABUSE: Status: ACTIVE | Noted: 2023-10-11

## 2023-10-11 LAB
25(OH)D3+25(OH)D2 SERPL-MCNC: 25 NG/ML (ref 30–96)
ABO + RH BLD: NORMAL
ALBUMIN SERPL BCP-MCNC: 3.4 G/DL (ref 3.5–5.2)
ALP SERPL-CCNC: 69 U/L (ref 55–135)
ALT SERPL W/O P-5'-P-CCNC: 30 U/L (ref 10–44)
ANION GAP SERPL CALC-SCNC: 8 MMOL/L (ref 8–16)
AST SERPL-CCNC: 26 U/L (ref 10–40)
BASOPHILS # BLD AUTO: 0.05 K/UL (ref 0–0.2)
BASOPHILS # BLD AUTO: 0.06 K/UL (ref 0–0.2)
BASOPHILS NFR BLD: 0.5 % (ref 0–1.9)
BASOPHILS NFR BLD: 0.8 % (ref 0–1.9)
BILIRUB SERPL-MCNC: 0.7 MG/DL (ref 0.1–1)
BILIRUB UR QL STRIP: NEGATIVE
BLD GP AB SCN CELLS X3 SERPL QL: NORMAL
BUN SERPL-MCNC: 23 MG/DL (ref 6–20)
CALCIUM SERPL-MCNC: 8.9 MG/DL (ref 8.7–10.5)
CHLORIDE SERPL-SCNC: 105 MMOL/L (ref 95–110)
CLARITY UR REFRACT.AUTO: CLEAR
CO2 SERPL-SCNC: 22 MMOL/L (ref 23–29)
COLOR UR AUTO: YELLOW
CREAT SERPL-MCNC: 0.8 MG/DL (ref 0.5–1.4)
DIFFERENTIAL METHOD: ABNORMAL
DIFFERENTIAL METHOD: ABNORMAL
EOSINOPHIL # BLD AUTO: 0.2 K/UL (ref 0–0.5)
EOSINOPHIL # BLD AUTO: 0.4 K/UL (ref 0–0.5)
EOSINOPHIL NFR BLD: 2.2 % (ref 0–8)
EOSINOPHIL NFR BLD: 5 % (ref 0–8)
ERYTHROCYTE [DISTWIDTH] IN BLOOD BY AUTOMATED COUNT: 13.5 % (ref 11.5–14.5)
ERYTHROCYTE [DISTWIDTH] IN BLOOD BY AUTOMATED COUNT: 13.7 % (ref 11.5–14.5)
EST. GFR  (NO RACE VARIABLE): >60 ML/MIN/1.73 M^2
ESTIMATED AVG GLUCOSE: 105 MG/DL (ref 68–131)
GLUCOSE SERPL-MCNC: 98 MG/DL (ref 70–110)
GLUCOSE UR QL STRIP: NEGATIVE
HBA1C MFR BLD: 5.3 % (ref 4–5.6)
HCT VFR BLD AUTO: 39.1 % (ref 40–54)
HCT VFR BLD AUTO: 40.3 % (ref 40–54)
HCV AB SERPL QL IA: REACTIVE
HGB BLD-MCNC: 12.7 G/DL (ref 14–18)
HGB BLD-MCNC: 13.2 G/DL (ref 14–18)
HGB UR QL STRIP: NEGATIVE
HIV 1+2 AB+HIV1 P24 AG SERPL QL IA: NORMAL
IMM GRANULOCYTES # BLD AUTO: 0.05 K/UL (ref 0–0.04)
IMM GRANULOCYTES # BLD AUTO: 0.05 K/UL (ref 0–0.04)
IMM GRANULOCYTES NFR BLD AUTO: 0.5 % (ref 0–0.5)
IMM GRANULOCYTES NFR BLD AUTO: 0.7 % (ref 0–0.5)
INR PPP: 1 (ref 0.8–1.2)
KETONES UR QL STRIP: NEGATIVE
LEUKOCYTE ESTERASE UR QL STRIP: NEGATIVE
LYMPHOCYTES # BLD AUTO: 2.3 K/UL (ref 1–4.8)
LYMPHOCYTES # BLD AUTO: 3 K/UL (ref 1–4.8)
LYMPHOCYTES NFR BLD: 24.3 % (ref 18–48)
LYMPHOCYTES NFR BLD: 39.2 % (ref 18–48)
MAGNESIUM SERPL-MCNC: 2 MG/DL (ref 1.6–2.6)
MCH RBC QN AUTO: 30 PG (ref 27–31)
MCH RBC QN AUTO: 30.3 PG (ref 27–31)
MCHC RBC AUTO-ENTMCNC: 32.5 G/DL (ref 32–36)
MCHC RBC AUTO-ENTMCNC: 32.8 G/DL (ref 32–36)
MCV RBC AUTO: 92 FL (ref 82–98)
MCV RBC AUTO: 93 FL (ref 82–98)
MONOCYTES # BLD AUTO: 0.3 K/UL (ref 0.3–1)
MONOCYTES # BLD AUTO: 0.5 K/UL (ref 0.3–1)
MONOCYTES NFR BLD: 2.7 % (ref 4–15)
MONOCYTES NFR BLD: 6.9 % (ref 4–15)
NEUTROPHILS # BLD AUTO: 3.6 K/UL (ref 1.8–7.7)
NEUTROPHILS # BLD AUTO: 6.6 K/UL (ref 1.8–7.7)
NEUTROPHILS NFR BLD: 47.4 % (ref 38–73)
NEUTROPHILS NFR BLD: 69.8 % (ref 38–73)
NITRITE UR QL STRIP: NEGATIVE
NRBC BLD-RTO: 0 /100 WBC
NRBC BLD-RTO: 0 /100 WBC
PH UR STRIP: 5 [PH] (ref 5–8)
PHOSPHATE SERPL-MCNC: 3.6 MG/DL (ref 2.7–4.5)
PLATELET # BLD AUTO: 269 K/UL (ref 150–450)
PLATELET # BLD AUTO: 274 K/UL (ref 150–450)
PMV BLD AUTO: 8.9 FL (ref 9.2–12.9)
PMV BLD AUTO: 9.7 FL (ref 9.2–12.9)
POTASSIUM SERPL-SCNC: 4.5 MMOL/L (ref 3.5–5.1)
PREALB SERPL-MCNC: 18 MG/DL (ref 20–43)
PROCALCITONIN SERPL IA-MCNC: 0.06 NG/ML
PROT SERPL-MCNC: 6.6 G/DL (ref 6–8.4)
PROT UR QL STRIP: NEGATIVE
PROTHROMBIN TIME: 10.5 SEC (ref 9–12.5)
RBC # BLD AUTO: 4.23 M/UL (ref 4.6–6.2)
RBC # BLD AUTO: 4.35 M/UL (ref 4.6–6.2)
SODIUM SERPL-SCNC: 135 MMOL/L (ref 136–145)
SP GR UR STRIP: 1.02 (ref 1–1.03)
SPECIMEN OUTDATE: NORMAL
TRANSFERRIN SERPL-MCNC: 204 MG/DL (ref 200–375)
URN SPEC COLLECT METH UR: NORMAL
WBC # BLD AUTO: 7.66 K/UL (ref 3.9–12.7)
WBC # BLD AUTO: 9.53 K/UL (ref 3.9–12.7)

## 2023-10-11 PROCEDURE — 21400001 HC TELEMETRY ROOM

## 2023-10-11 PROCEDURE — 25000003 PHARM REV CODE 250: Performed by: INTERNAL MEDICINE

## 2023-10-11 PROCEDURE — 63600175 PHARM REV CODE 636 W HCPCS: Performed by: ORTHOPAEDIC SURGERY

## 2023-10-11 PROCEDURE — 11000001 HC ACUTE MED/SURG PRIVATE ROOM

## 2023-10-11 PROCEDURE — D9220A PRA ANESTHESIA: ICD-10-PCS | Mod: ANES,,, | Performed by: STUDENT IN AN ORGANIZED HEALTH CARE EDUCATION/TRAINING PROGRAM

## 2023-10-11 PROCEDURE — 86920 COMPATIBILITY TEST SPIN: CPT | Performed by: STUDENT IN AN ORGANIZED HEALTH CARE EDUCATION/TRAINING PROGRAM

## 2023-10-11 PROCEDURE — 63600175 PHARM REV CODE 636 W HCPCS: Performed by: STUDENT IN AN ORGANIZED HEALTH CARE EDUCATION/TRAINING PROGRAM

## 2023-10-11 PROCEDURE — 94761 N-INVAS EAR/PLS OXIMETRY MLT: CPT

## 2023-10-11 PROCEDURE — 36000711: Performed by: ORTHOPAEDIC SURGERY

## 2023-10-11 PROCEDURE — 71000033 HC RECOVERY, INTIAL HOUR: Performed by: ORTHOPAEDIC SURGERY

## 2023-10-11 PROCEDURE — 85025 COMPLETE CBC W/AUTO DIFF WBC: CPT | Performed by: STUDENT IN AN ORGANIZED HEALTH CARE EDUCATION/TRAINING PROGRAM

## 2023-10-11 PROCEDURE — 84145 PROCALCITONIN (PCT): CPT | Performed by: STUDENT IN AN ORGANIZED HEALTH CARE EDUCATION/TRAINING PROGRAM

## 2023-10-11 PROCEDURE — 63600175 PHARM REV CODE 636 W HCPCS: Performed by: PHYSICIAN ASSISTANT

## 2023-10-11 PROCEDURE — 81003 URINALYSIS AUTO W/O SCOPE: CPT | Performed by: STUDENT IN AN ORGANIZED HEALTH CARE EDUCATION/TRAINING PROGRAM

## 2023-10-11 PROCEDURE — 25000003 PHARM REV CODE 250: Performed by: STUDENT IN AN ORGANIZED HEALTH CARE EDUCATION/TRAINING PROGRAM

## 2023-10-11 PROCEDURE — 93005 ELECTROCARDIOGRAM TRACING: CPT

## 2023-10-11 PROCEDURE — 25000003 PHARM REV CODE 250: Performed by: PHYSICIAN ASSISTANT

## 2023-10-11 PROCEDURE — 86900 BLOOD TYPING SEROLOGIC ABO: CPT | Performed by: INTERNAL MEDICINE

## 2023-10-11 PROCEDURE — 27245 TREAT THIGH FRACTURE: CPT | Mod: LT,,, | Performed by: ORTHOPAEDIC SURGERY

## 2023-10-11 PROCEDURE — 63600175 PHARM REV CODE 636 W HCPCS: Performed by: INTERNAL MEDICINE

## 2023-10-11 PROCEDURE — 86803 HEPATITIS C AB TEST: CPT | Performed by: ORTHOPAEDIC SURGERY

## 2023-10-11 PROCEDURE — 93010 EKG 12-LEAD: ICD-10-PCS | Mod: ,,, | Performed by: INTERNAL MEDICINE

## 2023-10-11 PROCEDURE — 84134 ASSAY OF PREALBUMIN: CPT | Performed by: STUDENT IN AN ORGANIZED HEALTH CARE EDUCATION/TRAINING PROGRAM

## 2023-10-11 PROCEDURE — 83036 HEMOGLOBIN GLYCOSYLATED A1C: CPT | Performed by: STUDENT IN AN ORGANIZED HEALTH CARE EDUCATION/TRAINING PROGRAM

## 2023-10-11 PROCEDURE — D9220A PRA ANESTHESIA: ICD-10-PCS | Mod: CRNA,,, | Performed by: NURSE ANESTHETIST, CERTIFIED REGISTERED

## 2023-10-11 PROCEDURE — 85025 COMPLETE CBC W/AUTO DIFF WBC: CPT | Mod: 91 | Performed by: INTERNAL MEDICINE

## 2023-10-11 PROCEDURE — D9220A PRA ANESTHESIA: Mod: CRNA,,, | Performed by: NURSE ANESTHETIST, CERTIFIED REGISTERED

## 2023-10-11 PROCEDURE — 84100 ASSAY OF PHOSPHORUS: CPT | Performed by: STUDENT IN AN ORGANIZED HEALTH CARE EDUCATION/TRAINING PROGRAM

## 2023-10-11 PROCEDURE — 99223 1ST HOSP IP/OBS HIGH 75: CPT | Mod: 25,,, | Performed by: INTERNAL MEDICINE

## 2023-10-11 PROCEDURE — S4991 NICOTINE PATCH NONLEGEND: HCPCS | Performed by: PHYSICIAN ASSISTANT

## 2023-10-11 PROCEDURE — 37000009 HC ANESTHESIA EA ADD 15 MINS: Performed by: ORTHOPAEDIC SURGERY

## 2023-10-11 PROCEDURE — D9220A PRA ANESTHESIA: Mod: ANES,,, | Performed by: STUDENT IN AN ORGANIZED HEALTH CARE EDUCATION/TRAINING PROGRAM

## 2023-10-11 PROCEDURE — 99900035 HC TECH TIME PER 15 MIN (STAT)

## 2023-10-11 PROCEDURE — 99406 PR TOBACCO USE CESSATION INTERMEDIATE 3-10 MINUTES: ICD-10-PCS | Mod: ,,, | Performed by: INTERNAL MEDICINE

## 2023-10-11 PROCEDURE — 84466 ASSAY OF TRANSFERRIN: CPT | Performed by: STUDENT IN AN ORGANIZED HEALTH CARE EDUCATION/TRAINING PROGRAM

## 2023-10-11 PROCEDURE — 37000008 HC ANESTHESIA 1ST 15 MINUTES: Performed by: ORTHOPAEDIC SURGERY

## 2023-10-11 PROCEDURE — 99406 BEHAV CHNG SMOKING 3-10 MIN: CPT | Mod: ,,, | Performed by: INTERNAL MEDICINE

## 2023-10-11 PROCEDURE — 90471 IMMUNIZATION ADMIN: CPT | Performed by: PHYSICIAN ASSISTANT

## 2023-10-11 PROCEDURE — 64999 UNLISTED PX NERVOUS SYSTEM: CPT | Mod: ,,, | Performed by: ANESTHESIOLOGY

## 2023-10-11 PROCEDURE — 82306 VITAMIN D 25 HYDROXY: CPT | Performed by: STUDENT IN AN ORGANIZED HEALTH CARE EDUCATION/TRAINING PROGRAM

## 2023-10-11 PROCEDURE — 85610 PROTHROMBIN TIME: CPT | Performed by: STUDENT IN AN ORGANIZED HEALTH CARE EDUCATION/TRAINING PROGRAM

## 2023-10-11 PROCEDURE — C1769 GUIDE WIRE: HCPCS | Performed by: ORTHOPAEDIC SURGERY

## 2023-10-11 PROCEDURE — 36415 COLL VENOUS BLD VENIPUNCTURE: CPT | Performed by: INTERNAL MEDICINE

## 2023-10-11 PROCEDURE — 63600175 PHARM REV CODE 636 W HCPCS: Performed by: NURSE ANESTHETIST, CERTIFIED REGISTERED

## 2023-10-11 PROCEDURE — 83735 ASSAY OF MAGNESIUM: CPT | Performed by: STUDENT IN AN ORGANIZED HEALTH CARE EDUCATION/TRAINING PROGRAM

## 2023-10-11 PROCEDURE — 63600175 PHARM REV CODE 636 W HCPCS: Performed by: ANESTHESIOLOGY

## 2023-10-11 PROCEDURE — 76942 ECHO GUIDE FOR BIOPSY: CPT | Mod: 26,ICN,, | Performed by: ANESTHESIOLOGY

## 2023-10-11 PROCEDURE — 64999 L SIFI CATHETER: ICD-10-PCS | Mod: ,,, | Performed by: ANESTHESIOLOGY

## 2023-10-11 PROCEDURE — 36000710: Performed by: ORTHOPAEDIC SURGERY

## 2023-10-11 PROCEDURE — 27245 PR OPEN FIX INTER/SUBTROCH FX,IMPLNT: ICD-10-PCS | Mod: LT,,, | Performed by: ORTHOPAEDIC SURGERY

## 2023-10-11 PROCEDURE — 71000015 HC POSTOP RECOV 1ST HR: Performed by: ORTHOPAEDIC SURGERY

## 2023-10-11 PROCEDURE — 99223 1ST HOSP IP/OBS HIGH 75: CPT | Mod: 57,,, | Performed by: ORTHOPAEDIC SURGERY

## 2023-10-11 PROCEDURE — C1713 ANCHOR/SCREW BN/BN,TIS/BN: HCPCS | Performed by: ORTHOPAEDIC SURGERY

## 2023-10-11 PROCEDURE — 27000221 HC OXYGEN, UP TO 24 HOURS

## 2023-10-11 PROCEDURE — 25000003 PHARM REV CODE 250: Performed by: NURSE ANESTHETIST, CERTIFIED REGISTERED

## 2023-10-11 PROCEDURE — 93010 ELECTROCARDIOGRAM REPORT: CPT | Mod: ,,, | Performed by: INTERNAL MEDICINE

## 2023-10-11 PROCEDURE — 90715 TDAP VACCINE 7 YRS/> IM: CPT | Performed by: PHYSICIAN ASSISTANT

## 2023-10-11 PROCEDURE — 64448 NJX AA&/STRD FEM NRV NFS IMG: CPT | Performed by: STUDENT IN AN ORGANIZED HEALTH CARE EDUCATION/TRAINING PROGRAM

## 2023-10-11 PROCEDURE — 99223 PR INITIAL HOSPITAL CARE,LEVL III: ICD-10-PCS | Mod: 25,,, | Performed by: INTERNAL MEDICINE

## 2023-10-11 PROCEDURE — 99223 PR INITIAL HOSPITAL CARE,LEVL III: ICD-10-PCS | Mod: 57,,, | Performed by: ORTHOPAEDIC SURGERY

## 2023-10-11 PROCEDURE — 76942 L SIFI CATHETER: ICD-10-PCS | Mod: 26,ICN,, | Performed by: ANESTHESIOLOGY

## 2023-10-11 PROCEDURE — 80053 COMPREHEN METABOLIC PANEL: CPT | Performed by: STUDENT IN AN ORGANIZED HEALTH CARE EDUCATION/TRAINING PROGRAM

## 2023-10-11 PROCEDURE — 27201423 OPTIME MED/SURG SUP & DEVICES STERILE SUPPLY: Performed by: ORTHOPAEDIC SURGERY

## 2023-10-11 PROCEDURE — 87389 HIV-1 AG W/HIV-1&-2 AB AG IA: CPT | Performed by: ORTHOPAEDIC SURGERY

## 2023-10-11 DEVICE — SCREW TI LK IMN 5.0X48MM ST: Type: IMPLANTABLE DEVICE | Site: FEMUR | Status: FUNCTIONAL

## 2023-10-11 DEVICE — SCREW FEM NECK PERF GOLD 105MM: Type: IMPLANTABLE DEVICE | Site: FEMUR | Status: FUNCTIONAL

## 2023-10-11 DEVICE — IMPLANTABLE DEVICE: Type: IMPLANTABLE DEVICE | Site: FEMUR | Status: FUNCTIONAL

## 2023-10-11 RX ORDER — FENTANYL CITRATE 50 UG/ML
25 INJECTION, SOLUTION INTRAMUSCULAR; INTRAVENOUS EVERY 5 MIN PRN
Status: DISCONTINUED | OUTPATIENT
Start: 2023-10-11 | End: 2023-10-11 | Stop reason: HOSPADM

## 2023-10-11 RX ORDER — MUPIROCIN 20 MG/G
OINTMENT TOPICAL
Status: CANCELLED | OUTPATIENT
Start: 2023-10-11

## 2023-10-11 RX ORDER — ACETAMINOPHEN 500 MG
1000 TABLET ORAL EVERY 6 HOURS
Status: DISCONTINUED | OUTPATIENT
Start: 2023-10-11 | End: 2023-10-11

## 2023-10-11 RX ORDER — MIDAZOLAM HYDROCHLORIDE 1 MG/ML
.5-4 INJECTION INTRAMUSCULAR; INTRAVENOUS
Status: DISCONTINUED | OUTPATIENT
Start: 2023-10-11 | End: 2023-10-11 | Stop reason: HOSPADM

## 2023-10-11 RX ORDER — MUPIROCIN 20 MG/G
OINTMENT TOPICAL
Status: DISCONTINUED | OUTPATIENT
Start: 2023-10-11 | End: 2023-10-11 | Stop reason: HOSPADM

## 2023-10-11 RX ORDER — OXYCODONE HYDROCHLORIDE 5 MG/1
5 TABLET ORAL
Status: DISCONTINUED | OUTPATIENT
Start: 2023-10-11 | End: 2023-10-11 | Stop reason: HOSPADM

## 2023-10-11 RX ORDER — ONDANSETRON 8 MG/1
8 TABLET, ORALLY DISINTEGRATING ORAL EVERY 8 HOURS PRN
Status: DISCONTINUED | OUTPATIENT
Start: 2023-10-11 | End: 2023-10-13 | Stop reason: HOSPADM

## 2023-10-11 RX ORDER — ROCURONIUM BROMIDE 10 MG/ML
INJECTION, SOLUTION INTRAVENOUS
Status: DISCONTINUED | OUTPATIENT
Start: 2023-10-11 | End: 2023-10-11

## 2023-10-11 RX ORDER — ROPIVACAINE HYDROCHLORIDE 5 MG/ML
INJECTION, SOLUTION EPIDURAL; INFILTRATION; PERINEURAL
Status: COMPLETED
Start: 2023-10-11 | End: 2023-10-11

## 2023-10-11 RX ORDER — FENTANYL CITRATE 50 UG/ML
25 INJECTION, SOLUTION INTRAMUSCULAR; INTRAVENOUS EVERY 5 MIN PRN
Status: DISCONTINUED | OUTPATIENT
Start: 2023-10-11 | End: 2023-10-11

## 2023-10-11 RX ORDER — LIDOCAINE HYDROCHLORIDE 20 MG/ML
INJECTION, SOLUTION EPIDURAL; INFILTRATION; INTRACAUDAL; PERINEURAL
Status: DISCONTINUED | OUTPATIENT
Start: 2023-10-11 | End: 2023-10-11

## 2023-10-11 RX ORDER — METHOCARBAMOL 500 MG/1
500 TABLET, FILM COATED ORAL EVERY 6 HOURS PRN
Status: DISCONTINUED | OUTPATIENT
Start: 2023-10-11 | End: 2023-10-13

## 2023-10-11 RX ORDER — TALC
6 POWDER (GRAM) TOPICAL NIGHTLY PRN
Status: DISCONTINUED | OUTPATIENT
Start: 2023-10-11 | End: 2023-10-13 | Stop reason: HOSPADM

## 2023-10-11 RX ORDER — BISACODYL 10 MG
10 SUPPOSITORY, RECTAL RECTAL DAILY PRN
Status: DISCONTINUED | OUTPATIENT
Start: 2023-10-11 | End: 2023-10-13 | Stop reason: HOSPADM

## 2023-10-11 RX ORDER — ACETAMINOPHEN 500 MG
1000 TABLET ORAL EVERY 8 HOURS
Status: DISCONTINUED | OUTPATIENT
Start: 2023-10-11 | End: 2023-10-11

## 2023-10-11 RX ORDER — MORPHINE SULFATE 2 MG/ML
1 INJECTION, SOLUTION INTRAMUSCULAR; INTRAVENOUS EVERY 4 HOURS PRN
Status: DISCONTINUED | OUTPATIENT
Start: 2023-10-11 | End: 2023-10-11

## 2023-10-11 RX ORDER — VANCOMYCIN HYDROCHLORIDE 1 G/20ML
INJECTION, POWDER, LYOPHILIZED, FOR SOLUTION INTRAVENOUS
Status: DISCONTINUED | OUTPATIENT
Start: 2023-10-11 | End: 2023-10-11 | Stop reason: HOSPADM

## 2023-10-11 RX ORDER — METHOCARBAMOL 750 MG/1
750 TABLET, FILM COATED ORAL EVERY 6 HOURS
Status: DISCONTINUED | OUTPATIENT
Start: 2023-10-11 | End: 2023-10-11

## 2023-10-11 RX ORDER — AMOXICILLIN 250 MG
1 CAPSULE ORAL 2 TIMES DAILY
Status: DISCONTINUED | OUTPATIENT
Start: 2023-10-11 | End: 2023-10-13 | Stop reason: HOSPADM

## 2023-10-11 RX ORDER — ONDANSETRON 2 MG/ML
4 INJECTION INTRAMUSCULAR; INTRAVENOUS EVERY 12 HOURS PRN
Status: DISCONTINUED | OUTPATIENT
Start: 2023-10-11 | End: 2023-10-13 | Stop reason: HOSPADM

## 2023-10-11 RX ORDER — SODIUM CHLORIDE 0.9 % (FLUSH) 0.9 %
10 SYRINGE (ML) INJECTION
Status: CANCELLED | OUTPATIENT
Start: 2023-10-11

## 2023-10-11 RX ORDER — PROCHLORPERAZINE EDISYLATE 5 MG/ML
5 INJECTION INTRAMUSCULAR; INTRAVENOUS EVERY 30 MIN PRN
Status: DISCONTINUED | OUTPATIENT
Start: 2023-10-11 | End: 2023-10-11 | Stop reason: HOSPADM

## 2023-10-11 RX ORDER — HYDROMORPHONE HYDROCHLORIDE 1 MG/ML
0.2 INJECTION, SOLUTION INTRAMUSCULAR; INTRAVENOUS; SUBCUTANEOUS EVERY 5 MIN PRN
Status: DISCONTINUED | OUTPATIENT
Start: 2023-10-11 | End: 2023-10-11 | Stop reason: HOSPADM

## 2023-10-11 RX ORDER — FENTANYL CITRATE 50 UG/ML
25-200 INJECTION, SOLUTION INTRAMUSCULAR; INTRAVENOUS
Status: DISCONTINUED | OUTPATIENT
Start: 2023-10-11 | End: 2023-10-11 | Stop reason: HOSPADM

## 2023-10-11 RX ORDER — CHOLECALCIFEROL (VITAMIN D3) 25 MCG
2000 TABLET ORAL DAILY
Status: DISCONTINUED | OUTPATIENT
Start: 2023-10-12 | End: 2023-10-13 | Stop reason: HOSPADM

## 2023-10-11 RX ORDER — IBUPROFEN 200 MG
1 TABLET ORAL
Status: COMPLETED | OUTPATIENT
Start: 2023-10-11 | End: 2023-10-12

## 2023-10-11 RX ORDER — ACETAMINOPHEN 650 MG/20.3ML
650 LIQUID ORAL EVERY 6 HOURS PRN
Status: DISCONTINUED | OUTPATIENT
Start: 2023-10-11 | End: 2023-10-11

## 2023-10-11 RX ORDER — SODIUM CHLORIDE 0.9 % (FLUSH) 0.9 %
10 SYRINGE (ML) INJECTION
Status: DISCONTINUED | OUTPATIENT
Start: 2023-10-11 | End: 2023-10-13 | Stop reason: HOSPADM

## 2023-10-11 RX ORDER — MUPIROCIN 20 MG/G
1 OINTMENT TOPICAL 2 TIMES DAILY
Status: DISCONTINUED | OUTPATIENT
Start: 2023-10-11 | End: 2023-10-13 | Stop reason: HOSPADM

## 2023-10-11 RX ORDER — LIDOCAINE HYDROCHLORIDE 10 MG/ML
1 INJECTION, SOLUTION EPIDURAL; INFILTRATION; INTRACAUDAL; PERINEURAL
Status: CANCELLED | OUTPATIENT
Start: 2023-10-11

## 2023-10-11 RX ORDER — ROPIVACAINE HYDROCHLORIDE 5 MG/ML
INJECTION, SOLUTION EPIDURAL; INFILTRATION; PERINEURAL
Status: COMPLETED | OUTPATIENT
Start: 2023-10-11 | End: 2023-10-11

## 2023-10-11 RX ORDER — ACETAMINOPHEN 500 MG
1000 TABLET ORAL EVERY 6 HOURS
Status: DISCONTINUED | OUTPATIENT
Start: 2023-10-11 | End: 2023-10-13 | Stop reason: HOSPADM

## 2023-10-11 RX ORDER — ROPIVACAINE HYDROCHLORIDE 2 MG/ML
0.1 INJECTION, SOLUTION EPIDURAL; INFILTRATION; PERINEURAL CONTINUOUS
Status: DISCONTINUED | OUTPATIENT
Start: 2023-10-11 | End: 2023-10-13

## 2023-10-11 RX ORDER — DEXMEDETOMIDINE HYDROCHLORIDE 100 UG/ML
INJECTION, SOLUTION INTRAVENOUS
Status: DISCONTINUED | OUTPATIENT
Start: 2023-10-11 | End: 2023-10-11

## 2023-10-11 RX ORDER — CEFAZOLIN SODIUM 1 G/3ML
INJECTION, POWDER, FOR SOLUTION INTRAMUSCULAR; INTRAVENOUS
Status: DISCONTINUED | OUTPATIENT
Start: 2023-10-11 | End: 2023-10-11

## 2023-10-11 RX ORDER — OXYCODONE HYDROCHLORIDE 5 MG/1
5 TABLET ORAL EVERY 4 HOURS PRN
Status: DISCONTINUED | OUTPATIENT
Start: 2023-10-11 | End: 2023-10-13 | Stop reason: HOSPADM

## 2023-10-11 RX ORDER — FERROUS SULFATE, DRIED 160(50) MG
2 TABLET, EXTENDED RELEASE ORAL DAILY
Status: DISCONTINUED | OUTPATIENT
Start: 2023-10-11 | End: 2023-10-11

## 2023-10-11 RX ORDER — EPHEDRINE SULFATE 50 MG/ML
INJECTION, SOLUTION INTRAVENOUS
Status: DISCONTINUED | OUTPATIENT
Start: 2023-10-11 | End: 2023-10-11

## 2023-10-11 RX ORDER — DEXAMETHASONE SODIUM PHOSPHATE 4 MG/ML
INJECTION, SOLUTION INTRA-ARTICULAR; INTRALESIONAL; INTRAMUSCULAR; INTRAVENOUS; SOFT TISSUE
Status: DISCONTINUED | OUTPATIENT
Start: 2023-10-11 | End: 2023-10-11

## 2023-10-11 RX ORDER — CLINDAMYCIN PHOSPHATE 900 MG/50ML
900 INJECTION, SOLUTION INTRAVENOUS
Status: DISCONTINUED | OUTPATIENT
Start: 2023-10-11 | End: 2023-10-11

## 2023-10-11 RX ORDER — PROPOFOL 10 MG/ML
VIAL (ML) INTRAVENOUS
Status: DISCONTINUED | OUTPATIENT
Start: 2023-10-11 | End: 2023-10-11

## 2023-10-11 RX ORDER — OXYCODONE HYDROCHLORIDE 5 MG/1
5 TABLET ORAL EVERY 6 HOURS PRN
Status: DISCONTINUED | OUTPATIENT
Start: 2023-10-11 | End: 2023-10-11

## 2023-10-11 RX ORDER — ROPIVACAINE HYDROCHLORIDE 2 MG/ML
0.1 INJECTION, SOLUTION EPIDURAL; INFILTRATION; PERINEURAL CONTINUOUS
Status: DISCONTINUED | OUTPATIENT
Start: 2023-10-11 | End: 2023-10-11

## 2023-10-11 RX ORDER — MUPIROCIN 20 MG/G
1 OINTMENT TOPICAL
Status: CANCELLED | OUTPATIENT
Start: 2023-10-11

## 2023-10-11 RX ORDER — POLYETHYLENE GLYCOL 3350 17 G/17G
17 POWDER, FOR SOLUTION ORAL DAILY
Status: DISCONTINUED | OUTPATIENT
Start: 2023-10-11 | End: 2023-10-13 | Stop reason: HOSPADM

## 2023-10-11 RX ORDER — SODIUM CHLORIDE 9 MG/ML
INJECTION, SOLUTION INTRAVENOUS CONTINUOUS
Status: ACTIVE | OUTPATIENT
Start: 2023-10-11 | End: 2023-10-12

## 2023-10-11 RX ORDER — KETAMINE HCL IN 0.9 % NACL 50 MG/5 ML
SYRINGE (ML) INTRAVENOUS
Status: DISCONTINUED | OUTPATIENT
Start: 2023-10-11 | End: 2023-10-11

## 2023-10-11 RX ORDER — PREGABALIN 75 MG/1
75 CAPSULE ORAL NIGHTLY
Status: DISCONTINUED | OUTPATIENT
Start: 2023-10-11 | End: 2023-10-11 | Stop reason: HOSPADM

## 2023-10-11 RX ADMIN — METHOCARBAMOL 500 MG: 500 TABLET ORAL at 05:10

## 2023-10-11 RX ADMIN — CEFAZOLIN 2 G: 330 INJECTION, POWDER, FOR SOLUTION INTRAMUSCULAR; INTRAVENOUS at 12:10

## 2023-10-11 RX ADMIN — DEXMEDETOMIDINE 8 MCG: 100 INJECTION, SOLUTION, CONCENTRATE INTRAVENOUS at 02:10

## 2023-10-11 RX ADMIN — SODIUM CHLORIDE: 9 INJECTION, SOLUTION INTRAVENOUS at 04:10

## 2023-10-11 RX ADMIN — EPHEDRINE SULFATE 5 MG: 50 INJECTION INTRAVENOUS at 12:10

## 2023-10-11 RX ADMIN — HYDROMORPHONE HYDROCHLORIDE 0.2 MG: 1 INJECTION, SOLUTION INTRAMUSCULAR; INTRAVENOUS; SUBCUTANEOUS at 03:10

## 2023-10-11 RX ADMIN — SODIUM CHLORIDE, SODIUM GLUCONATE, SODIUM ACETATE, POTASSIUM CHLORIDE, MAGNESIUM CHLORIDE, SODIUM PHOSPHATE, DIBASIC, AND POTASSIUM PHOSPHATE: .53; .5; .37; .037; .03; .012; .00082 INJECTION, SOLUTION INTRAVENOUS at 02:10

## 2023-10-11 RX ADMIN — LIDOCAINE HYDROCHLORIDE 100 MG: 20 INJECTION, SOLUTION EPIDURAL; INFILTRATION; INTRACAUDAL at 12:10

## 2023-10-11 RX ADMIN — VANCOMYCIN HYDROCHLORIDE 1000 MG: 1 INJECTION, POWDER, LYOPHILIZED, FOR SOLUTION INTRAVENOUS at 11:10

## 2023-10-11 RX ADMIN — DEXAMETHASONE SODIUM PHOSPHATE 4 MG: 4 INJECTION, SOLUTION INTRAMUSCULAR; INTRAVENOUS at 12:10

## 2023-10-11 RX ADMIN — FENTANYL CITRATE 100 MCG: 50 INJECTION INTRAMUSCULAR; INTRAVENOUS at 12:10

## 2023-10-11 RX ADMIN — Medication 1 PATCH: at 04:10

## 2023-10-11 RX ADMIN — Medication 20 MG: at 01:10

## 2023-10-11 RX ADMIN — ROCURONIUM BROMIDE 20 MG: 10 INJECTION INTRAVENOUS at 01:10

## 2023-10-11 RX ADMIN — PROPOFOL 160 MG: 10 INJECTION, EMULSION INTRAVENOUS at 12:10

## 2023-10-11 RX ADMIN — APIXABAN 2.5 MG: 2.5 TABLET, FILM COATED ORAL at 08:10

## 2023-10-11 RX ADMIN — ACETAMINOPHEN 1000 MG: 500 TABLET ORAL at 08:10

## 2023-10-11 RX ADMIN — DEXMEDETOMIDINE 12 MCG: 100 INJECTION, SOLUTION, CONCENTRATE INTRAVENOUS at 01:10

## 2023-10-11 RX ADMIN — FENTANYL CITRATE 25 MCG: 50 INJECTION INTRAMUSCULAR; INTRAVENOUS at 02:10

## 2023-10-11 RX ADMIN — FENTANYL CITRATE 25 MCG: 50 INJECTION INTRAMUSCULAR; INTRAVENOUS at 03:10

## 2023-10-11 RX ADMIN — ACETAMINOPHEN 1000 MG: 500 TABLET ORAL at 05:10

## 2023-10-11 RX ADMIN — ROCURONIUM BROMIDE 50 MG: 10 INJECTION INTRAVENOUS at 12:10

## 2023-10-11 RX ADMIN — SODIUM CHLORIDE: 9 INJECTION, SOLUTION INTRAVENOUS at 03:10

## 2023-10-11 RX ADMIN — MIDAZOLAM 2 MG: 1 INJECTION INTRAMUSCULAR; INTRAVENOUS at 11:10

## 2023-10-11 RX ADMIN — OXYCODONE HYDROCHLORIDE 5 MG: 5 TABLET ORAL at 08:10

## 2023-10-11 RX ADMIN — MUPIROCIN: 20 OINTMENT TOPICAL at 11:10

## 2023-10-11 RX ADMIN — ACETAMINOPHEN 1000 MG: 500 TABLET ORAL at 11:10

## 2023-10-11 RX ADMIN — OXYCODONE HYDROCHLORIDE 5 MG: 5 TABLET ORAL at 03:10

## 2023-10-11 RX ADMIN — ROPIVACAINE HYDROCHLORIDE 0.1 ML/HR: 2 INJECTION, SOLUTION EPIDURAL; INFILTRATION at 02:10

## 2023-10-11 RX ADMIN — Medication 10 MG: at 01:10

## 2023-10-11 RX ADMIN — ROPIVACAINE HYDROCHLORIDE 20 ML: 5 INJECTION EPIDURAL; INFILTRATION; PERINEURAL at 12:10

## 2023-10-11 RX ADMIN — Medication 20 MG: at 12:10

## 2023-10-11 RX ADMIN — MUPIROCIN 1 G: 20 OINTMENT TOPICAL at 08:10

## 2023-10-11 RX ADMIN — CEFAZOLIN 2 G: 2 INJECTION, POWDER, FOR SOLUTION INTRAMUSCULAR; INTRAVENOUS at 08:10

## 2023-10-11 RX ADMIN — POLYETHYLENE GLYCOL 3350 17 G: 17 POWDER, FOR SOLUTION ORAL at 02:10

## 2023-10-11 RX ADMIN — Medication 2 TABLET: at 08:10

## 2023-10-11 RX ADMIN — SENNOSIDES AND DOCUSATE SODIUM 1 TABLET: 50; 8.6 TABLET ORAL at 08:10

## 2023-10-11 RX ADMIN — SODIUM CHLORIDE: 0.9 INJECTION, SOLUTION INTRAVENOUS at 12:10

## 2023-10-11 RX ADMIN — ONDANSETRON 4 MG: 2 INJECTION INTRAMUSCULAR; INTRAVENOUS at 01:10

## 2023-10-11 RX ADMIN — TETANUS TOXOID, REDUCED DIPHTHERIA TOXOID AND ACELLULAR PERTUSSIS VACCINE, ADSORBED 0.5 ML: 5; 2.5; 8; 8; 2.5 SUSPENSION INTRAMUSCULAR at 04:10
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
## 2023-10-11 NOTE — TRANSFER OF CARE
"Anesthesia Transfer of Care Note    Patient: Ramez Rand    Procedure(s) Performed: Procedure(s) (LRB):  INSERTION, INTRAMEDULLARY SKY, FEMUR (Left)    Patient location: PACU    Anesthesia Type: general    Transport from OR: Transported from OR on 6-10 L/min O2 by face mask with adequate spontaneous ventilation    Post pain: adequate analgesia    Post assessment: no apparent anesthetic complications and tolerated procedure well    Post vital signs: stable    Level of consciousness: sedated    Nausea/Vomiting: no nausea/vomiting    Complications: none    Transfer of care protocol was followed      Last vitals:   Visit Vitals  BP (!) 153/85 (BP Location: Left arm, Patient Position: Lying)   Pulse 68   Temp 36.7 °C (98.1 °F) (Temporal)   Resp 20   Ht 6' 3" (1.905 m)   Wt 86.2 kg (190 lb)   SpO2 100%   BMI 23.75 kg/m²     "
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
## 2023-10-11 NOTE — ASSESSMENT & PLAN NOTE
Dangers of cigarette smoking were reviewed with patient in detail for 5 minutes and patient was encouraged to quit. Nicotine replacement options were discussed. Nicotine patches ordered

## 2023-10-11 NOTE — ED NOTES
Telemetry Verification   Patient placed on Telemetry Box  Verified with War Room  Tech    Box # [id number]   Rate 66   Rhythm nsr

## 2023-10-11 NOTE — NURSING TRANSFER
Nursing Transfer Note      10/11/2023   4:33 PM    Nurse giving handoff:Bronson PACU RN  Nurse receiving handoff:Aicha AVILA nurse    Reason patient is being transferred: post anesthesia    Transfer From: PACU to 544    Transfer via bed    Transfer with cardiac monitoring    Transported by transport [facility]      Telemetry: Box Number [id number]  Order for Tele Monitor? Yes    Medicines sent: ropivacaine drip    Any special needs or follow-up needed: routine      Chart send with patient: Yes    Notified: no family listed in chart
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
## 2023-10-11 NOTE — ANESTHESIA PROCEDURE NOTES
L SIFI catheter    Patient location during procedure: pre-op   Block not for primary anesthetic.  Reason for block: at surgeon's request and post-op pain management   Post-op Pain Location: L leg pain   Start time: 10/11/2023 11:46 AM  Timeout: 10/11/2023 11:45 AM   End time: 10/11/2023 12:05 PM    Staffing  Authorizing Provider: Mehdi Lynch MD  Performing Provider: Kam Reyes MD    Staffing  Performed by: Kam Reyes MD  Authorized by: Mehdi Lynch MD    Preanesthetic Checklist  Completed: patient identified, IV checked, site marked, risks and benefits discussed, surgical consent, monitors and equipment checked, pre-op evaluation and timeout performed  Peripheral Block  Patient position: supine  Prep: ChloraPrep and site prepped and draped  Patient monitoring: heart rate, cardiac monitor, continuous pulse ox, continuous capnometry and frequent blood pressure checks  Block type: fascia iliaca  Laterality: left  Injection technique: continuous  Needle  Needle type: Tuohy   Needle gauge: 17 G  Needle length: 3.5 in  Needle localization: anatomical landmarks and ultrasound guidance  Catheter type: spring wound  Catheter size: 19 G  Test dose: lidocaine 1.5% with Epi 1-to-200,000 and negative   -ultrasound image captured on disc.  Assessment  Injection assessment: negative aspiration, negative parasthesia and local visualized surrounding nerve  Paresthesia pain: none  Heart rate change: no  Slow fractionated injection: yes    Medications:    Medications: ropivacaine (NAROPIN) injection 0.5% - Perineural   20 mL - 10/11/2023 12:05:00 PM    Additional Notes  VSS.  [facility] RN monitoring vitals throughout procedure.  Patient tolerated procedure well.
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
## 2023-10-11 NOTE — ASSESSMENT & PLAN NOTE
Chronic, discussed withdrawal symptoms only stated will have tremors, but no seizures in the past, will place CIWA, can add PRNs if needed

## 2023-10-11 NOTE — ASSESSMENT & PLAN NOTE
Acute Fracture  Surgical Risk Assessment:    Active Cardiac Issues:  Active decompensated heart failure? No   Unstable angina?  No   Significant uncontrolled arrhythmias? No   Severe valvular heart disease-Aortic or Mitral Stenosis? No   Recent MI or coronary revascularization < 30 days? No     Cardiac Risk Factors:  Intermediate/high risk surgery? No   History of CAD/ischemic heart disease? No   History of cerebrovascular disease? No   History of compensated heart failure? No   Type 2 diabetes requiring insulin? No   Serum Creatinine > 2? No   Total cardiac risk factors 0     Functional mets MODERATE to EXCELLENT    < 4 METs -unable to walk > 2 blocks on level ground without stopping due to symptoms  - eating, dressing, toileting, walking indoors, light housework. POOR   > 4 METs -climbing > 1 flight of stairs without stopping  -walking up hill > 1-2 blocks  -scrubbing floors  -moving furniture  - golf, bowling, dancing or tennis  -running short distance MODERATE to EXCELLENT       · Hip Fracture Pathway initiated  · Orthopedics consulted.  Plan to go to the OR on 10/11.    · Continue betablocker, statin; hold ACEi or ARB day of surgery   · For high risk of post-op pulmonary complications, scheduled duonebs and incentive spirometry to start after surgery   · For high risk of post-op delirium, minimize CNS-active meds (opiatess, benzos, anticholinergics) and sleep hygiene with windowshades open during day, no daytime naps, frequent re-orientation, private room if feasible  · For probable senile osteoporosis, check Vitamin D level.  If/for Vit D deficiency and probable senile osteopenia/osteoporosis, start Vit D and Ca, follow up in Ortho clinic per new protocol  · DVT prophylaxis for 4 weeks post-op  · PT/OT to start on POD 1  · Wells to be removed on POD 1  · Pain control:  Pregabalin 75mg PO qHS and scheduled Tylenol 1g TID.  Oxy PO prn with Morphine IV prn for pain not controlled with PO  medications    Perioperative Risk Assessment:  RCRI Score 0, Class 1 risk with 3.9% risk of cardiopulmonary complications    Patient is at low/Intermediate risk for perioperative cardiopulmonary complications.  The benefit of surgery outweighs the risk of surgery at this time, there are currently no absolute contraindications to surgery.   NSQIP:

## 2023-10-11 NOTE — OR NURSING
Upon arrival in [facility] and RN present at the bedside, pt identifiers confirmed with pt. It was then discovered by this nurse that the pt's last name on the armband and EMR was incorrect. Pt states that his name is spelled [id number]. And the last name on the chart, armband and EMR was [id number]. Anesthesia consent was obtained utilizing the armband code with the incorrect spelling of the last name.  Surgery registration desk changed the last name using the correct spelling as documented on the scanned insurance card. Anesthesia consent witnessed by this RN after the 2 patient identifiers were corrected, a new armband and labels were printed and applied.

## 2023-10-11 NOTE — SUBJECTIVE & OBJECTIVE
"Past Medical History:   Diagnosis Date    Arthritis     Hypertension        No past surgical history on file.    Review of patient's allergies indicates:  No Known Allergies    Current Facility-Administered Medications   Medication    Tdap (BOOSTRIX) vaccine injection 0.5 mL     No current outpatient medications on file.     Family History    None       Tobacco Use    Smoking status: Every Day     Current packs/day: 1.00     Types: Cigarettes    Smokeless tobacco: Not on file   Substance and Sexual Activity    Alcohol use: Yes     Comment: Daily beer and whiskkey    Drug use: Yes     Types: Marijuana, IV     Comment: last use 2 days ago    Sexual activity: Not on file     ROS  Constitutional: negative for fevers/chills/night sweats  Eyes: no acute visual changes  ENT: negative acute  for hearing loss  Respiratory: negative for dyspnea  Cardiovascular: negative for chest pain  Gastrointestinal: negative for abdominal pain  Genitourinary: negative for dysuria  Neurological: negative for headaches  Behavioral/Psych: negative for hallucinations  Endocrine: negative for temperature intolerance  MSK: per HPI  Objective:     Vital Signs (Most Recent):  Temp: 97.9 °F (36.6 °C) (10/10/23 2148)  Pulse: 71 (10/10/23 2148)  Resp: 18 (10/10/23 2351)  BP: (!) 153/65 (10/10/23 2148)  SpO2: 99 % (10/10/23 2148) Vital Signs (24h Range):  Temp:  [97.9 °F (36.6 °C)-98.5 °F (36.9 °C)] 97.9 °F (36.6 °C)  Pulse:  [71-85] 71  Resp:  [18-20] 18  SpO2:  [99 %-100 %] 99 %  BP: (150-153)/(65-85) 153/65     Weight: 86.2 kg (190 lb)  Height: 6' 3" (190.5 cm)  Body mass index is 23.75 kg/m².    No intake or output data in the 24 hours ending 10/11/23 0036     Ortho/SPM Exam  General:  no acute distress, appears stated age   Neuro: alert and oriented x3  Psych: normal mood  Head: normocephalic, atraumatic.  Eyes: no scleral icterus  Mouth: moist mucous membranes  Cardiovascular: extremities warm and well perfused  Lungs: breathing comfortably, " equal chest rise bilat  Skin: clean, dry, intact (any exceptions noted in below musculoskeletal exam)       Musculoskeletal  Right Upper Extremity     -Skin, Intact : no ecchymosis, lesions, lacerations or abrasions   -Deltoid, biceps, triceps intact   -ROM  full without pain  -SILT M/R/U/Ax  -Motor intact Ain/PIN/U/Ax  -WWP     Left Upper Extremity     -Skin, Intact : no ecchymosis, lesions, lacerations or abrasions   -Deltoid, biceps, triceps intact   -ROM full without pain  -SILT M/R/U/Ax  -Motor intact Ain/PIN/U/Ax  -WWP     Right Lower Extremity Exam     - Skin Intact :abrasion over the upper thigh  - Quad/ Hip flexor intact   - ROM full without pain  - TA/EHL/Gastroc/FHL intact  - SILT throughout  - WWP        Left Lower Extremity Exam    - Skin Intact : no ecchymosis, lesions, lacerations or abrasions   - Quad/ Hip flexor intact   - Painful ROM   - TA/EHL/Gastroc/FHL intact  - SILT throughout  - WWP  - negative log roll and stinchfield's test    Spine: No step off of spinous process tenderness throughout, No sacral ulcers.        All joints (shoulder/elbow/wrist/hip/knee/ankle) were examined and had full ROM and were non-tender to palpation except as above          Significant Labs:   Recent Lab Results       None          All pertinent labs within the past 24 hours have been reviewed.    Significant Imaging: I have reviewed and interpreted all pertinent imaging results/findings.  X-ray of the pelvis shows a mildly displaced left greater trochanteric fracture without obvious extension in the intertrochanteric region.  MRI is pending
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
## 2023-10-11 NOTE — ED PROVIDER NOTES
I assumed care of this patient from the outgoing physician assistant at shift change.  At that time patient was pending imaging studies.  Patient has a hip fracture and will need surgery.  Patient will be admitted.  I discussed the case with Orthopedic surgery, Hospital Medicine and case management     Kody Fagan PA-C  10/11/23 0420

## 2023-10-11 NOTE — PLAN OF CARE
I have reviewed the chart of Ramez Mensah and collaborated with Yuki Pitts MD in the care of the patient who is hospitalized for the following:    Active Hospital Problems    Diagnosis    *Closed fracture of trochanter of left femur    Primary hypertension    Tobacco abuse    Alcohol abuse, uncomplicated    Hyponatremia     No intervention- will monitor      Hypovitaminosis D          I have reviewed the Ramez Mensah with the multidisciplinary team during discharge huddle.       Faina Frost PA-C  [facility]
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
## 2023-10-11 NOTE — ANESTHESIA PROCEDURE NOTES
Intubation    Date/Time: 10/11/2023 12:27 PM    Performed by: Ellie Vo CRNA  Authorized by: Ellie Vo CRNA    Intubation:     Induction:  Intravenous    Intubated:  Postinduction    Mask Ventilation:  Moderately difficult with oral airway    Attempts:  1    Attempted By:  CRNA    Method of Intubation:  Video laryngoscopy    Blade:  Duval 3    Laryngeal View Grade: Grade I - full view of cords      Difficult Airway Encountered?: No      Complications:  None    Airway Device:  Oral endotracheal tube    Airway Device Size:  7.5    Tube secured:  23    Secured at:  The lips    Placement Verified By:  Capnometry    Complicating Factors:  Anterior larynx    Findings Post-Intubation:  BS equal bilateral and atraumatic/condition of teeth unchanged
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
## 2023-10-11 NOTE — ASSESSMENT & PLAN NOTE
Chronic, uncontrolled currently  May need to start amlodipine or lisinopril while inpatient but will hold perioperatively
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
## 2023-10-12 ENCOUNTER — TELEPHONE (OUTPATIENT)
Dept: HEPATOLOGY | Facility: CLINIC | Age: 58
End: 2023-10-12
Payer: MEDICAID

## 2023-10-12 PROBLEM — B19.20 HEPATITIS C: Status: ACTIVE | Noted: 2023-10-12

## 2023-10-12 LAB
ANION GAP SERPL CALC-SCNC: 6 MMOL/L (ref 8–16)
BASOPHILS # BLD AUTO: 0.03 K/UL (ref 0–0.2)
BASOPHILS NFR BLD: 0.2 % (ref 0–1.9)
BUN SERPL-MCNC: 15 MG/DL (ref 6–20)
CALCIUM SERPL-MCNC: 8.9 MG/DL (ref 8.7–10.5)
CHLORIDE SERPL-SCNC: 104 MMOL/L (ref 95–110)
CO2 SERPL-SCNC: 26 MMOL/L (ref 23–29)
CREAT SERPL-MCNC: 0.7 MG/DL (ref 0.5–1.4)
DIFFERENTIAL METHOD: ABNORMAL
EOSINOPHIL # BLD AUTO: 0 K/UL (ref 0–0.5)
EOSINOPHIL NFR BLD: 0.2 % (ref 0–8)
ERYTHROCYTE [DISTWIDTH] IN BLOOD BY AUTOMATED COUNT: 13.3 % (ref 11.5–14.5)
EST. GFR  (NO RACE VARIABLE): >60 ML/MIN/1.73 M^2
GLUCOSE SERPL-MCNC: 99 MG/DL (ref 70–110)
HCT VFR BLD AUTO: 41.5 % (ref 40–54)
HCV RNA SERPL NAA+PROBE-LOG IU: 6.06 LOGIU/ML
HCV RNA SERPL QL NAA+PROBE: DETECTED
HCV RNA SPEC NAA+PROBE-ACNC: ABNORMAL IU/ML
HGB BLD-MCNC: 13.6 G/DL (ref 14–18)
IMM GRANULOCYTES # BLD AUTO: 0.1 K/UL (ref 0–0.04)
IMM GRANULOCYTES NFR BLD AUTO: 0.8 % (ref 0–0.5)
LYMPHOCYTES # BLD AUTO: 2.6 K/UL (ref 1–4.8)
LYMPHOCYTES NFR BLD: 20.2 % (ref 18–48)
MAGNESIUM SERPL-MCNC: 1.7 MG/DL (ref 1.6–2.6)
MCH RBC QN AUTO: 29.9 PG (ref 27–31)
MCHC RBC AUTO-ENTMCNC: 32.8 G/DL (ref 32–36)
MCV RBC AUTO: 91 FL (ref 82–98)
MONOCYTES # BLD AUTO: 0.7 K/UL (ref 0.3–1)
MONOCYTES NFR BLD: 5.7 % (ref 4–15)
NEUTROPHILS # BLD AUTO: 9.5 K/UL (ref 1.8–7.7)
NEUTROPHILS NFR BLD: 72.9 % (ref 38–73)
NRBC BLD-RTO: 0 /100 WBC
PHOSPHATE SERPL-MCNC: 4.1 MG/DL (ref 2.7–4.5)
PLATELET # BLD AUTO: 281 K/UL (ref 150–450)
PMV BLD AUTO: 9.3 FL (ref 9.2–12.9)
POTASSIUM SERPL-SCNC: 4.7 MMOL/L (ref 3.5–5.1)
RBC # BLD AUTO: 4.55 M/UL (ref 4.6–6.2)
SODIUM SERPL-SCNC: 136 MMOL/L (ref 136–145)
WBC # BLD AUTO: 13.04 K/UL (ref 3.9–12.7)

## 2023-10-12 PROCEDURE — 84100 ASSAY OF PHOSPHORUS: CPT | Performed by: INTERNAL MEDICINE

## 2023-10-12 PROCEDURE — 99231 PR SUBSEQUENT HOSPITAL CARE,LEVL I: ICD-10-PCS | Mod: ,,, | Performed by: SURGERY

## 2023-10-12 PROCEDURE — 25000003 PHARM REV CODE 250: Performed by: STUDENT IN AN ORGANIZED HEALTH CARE EDUCATION/TRAINING PROGRAM

## 2023-10-12 PROCEDURE — 97530 THERAPEUTIC ACTIVITIES: CPT

## 2023-10-12 PROCEDURE — 99231 SBSQ HOSP IP/OBS SF/LOW 25: CPT | Mod: ,,, | Performed by: SURGERY

## 2023-10-12 PROCEDURE — 97116 GAIT TRAINING THERAPY: CPT

## 2023-10-12 PROCEDURE — 99233 PR SUBSEQUENT HOSPITAL CARE,LEVL III: ICD-10-PCS | Mod: ,,, | Performed by: HOSPITALIST

## 2023-10-12 PROCEDURE — 36415 COLL VENOUS BLD VENIPUNCTURE: CPT | Performed by: HOSPITALIST

## 2023-10-12 PROCEDURE — 36415 COLL VENOUS BLD VENIPUNCTURE: CPT | Performed by: INTERNAL MEDICINE

## 2023-10-12 PROCEDURE — 25000003 PHARM REV CODE 250: Performed by: INTERNAL MEDICINE

## 2023-10-12 PROCEDURE — 99233 SBSQ HOSP IP/OBS HIGH 50: CPT | Mod: ,,, | Performed by: HOSPITALIST

## 2023-10-12 PROCEDURE — 83735 ASSAY OF MAGNESIUM: CPT | Performed by: INTERNAL MEDICINE

## 2023-10-12 PROCEDURE — 63600175 PHARM REV CODE 636 W HCPCS: Performed by: INTERNAL MEDICINE

## 2023-10-12 PROCEDURE — 85025 COMPLETE CBC W/AUTO DIFF WBC: CPT | Performed by: INTERNAL MEDICINE

## 2023-10-12 PROCEDURE — 80048 BASIC METABOLIC PNL TOTAL CA: CPT | Performed by: INTERNAL MEDICINE

## 2023-10-12 PROCEDURE — 87522 HEPATITIS C REVRS TRNSCRPJ: CPT | Performed by: HOSPITALIST

## 2023-10-12 PROCEDURE — 11000001 HC ACUTE MED/SURG PRIVATE ROOM

## 2023-10-12 PROCEDURE — 97165 OT EVAL LOW COMPLEX 30 MIN: CPT

## 2023-10-12 PROCEDURE — 97161 PT EVAL LOW COMPLEX 20 MIN: CPT

## 2023-10-12 RX ORDER — AMOXICILLIN 250 MG
1 CAPSULE ORAL 2 TIMES DAILY
Qty: 60 TABLET | Refills: 0 | Status: SHIPPED | OUTPATIENT
Start: 2023-10-12 | End: 2023-10-17 | Stop reason: SDUPTHER

## 2023-10-12 RX ORDER — ACETAMINOPHEN 500 MG
1000 TABLET ORAL EVERY 8 HOURS
Refills: 0
Start: 2023-10-12 | End: 2023-10-17 | Stop reason: SDUPTHER

## 2023-10-12 RX ORDER — CHOLECALCIFEROL (VITAMIN D3) 50 MCG
2000 TABLET ORAL DAILY
Qty: 60 TABLET | Refills: 2 | Status: SHIPPED | OUTPATIENT
Start: 2023-10-13 | End: 2023-10-17 | Stop reason: SDUPTHER

## 2023-10-12 RX ORDER — OXYCODONE HYDROCHLORIDE 5 MG/1
5 TABLET ORAL EVERY 6 HOURS PRN
Qty: 28 TABLET | Refills: 0 | Status: SHIPPED | OUTPATIENT
Start: 2023-10-12 | End: 2023-10-17 | Stop reason: SDUPTHER

## 2023-10-12 RX ORDER — METHOCARBAMOL 500 MG/1
500 TABLET, FILM COATED ORAL EVERY 6 HOURS PRN
Qty: 60 TABLET | Refills: 1 | Status: SHIPPED | OUTPATIENT
Start: 2023-10-12 | End: 2023-10-17 | Stop reason: SDUPTHER

## 2023-10-12 RX ADMIN — MUPIROCIN 1 G: 20 OINTMENT TOPICAL at 08:10

## 2023-10-12 RX ADMIN — ACETAMINOPHEN 1000 MG: 500 TABLET ORAL at 05:10

## 2023-10-12 RX ADMIN — ACETAMINOPHEN 1000 MG: 500 TABLET ORAL at 06:10

## 2023-10-12 RX ADMIN — ACETAMINOPHEN 1000 MG: 500 TABLET ORAL at 12:10

## 2023-10-12 RX ADMIN — OXYCODONE HYDROCHLORIDE 5 MG: 5 TABLET ORAL at 08:10

## 2023-10-12 RX ADMIN — SENNOSIDES AND DOCUSATE SODIUM 1 TABLET: 50; 8.6 TABLET ORAL at 08:10

## 2023-10-12 RX ADMIN — METHOCARBAMOL 500 MG: 500 TABLET ORAL at 08:10

## 2023-10-12 RX ADMIN — OXYCODONE HYDROCHLORIDE 5 MG: 5 TABLET ORAL at 06:10

## 2023-10-12 RX ADMIN — CEFAZOLIN 2 G: 2 INJECTION, POWDER, FOR SOLUTION INTRAMUSCULAR; INTRAVENOUS at 05:10

## 2023-10-12 RX ADMIN — APIXABAN 2.5 MG: 2.5 TABLET, FILM COATED ORAL at 08:10

## 2023-10-12 RX ADMIN — CHOLECALCIFEROL TAB 25 MCG (1000 UNIT) 2000 UNITS: 25 TAB at 08:10

## 2023-10-12 RX ADMIN — POLYETHYLENE GLYCOL 3350 17 G: 17 POWDER, FOR SOLUTION ORAL at 08:10

## 2023-10-12 NOTE — ANESTHESIA POSTPROCEDURE EVALUATION
Anesthesia Post Evaluation    Patient: Ramez Rand    Procedure(s) Performed: Procedure(s) (LRB):  INSERTION, INTRAMEDULLARY SKY, FEMUR (Left)    Final Anesthesia Type: general      Patient location during evaluation: PACU  Patient participation: Yes- Able to Participate  Level of consciousness: awake  Post-procedure vital signs: reviewed and stable  Pain management: adequate  Airway patency: patent    PONV status at discharge: No PONV  Anesthetic complications: no      Cardiovascular status: blood pressure returned to baseline  Respiratory status: unassisted, spontaneous ventilation and room air            Vitals Value Taken Time   /78 10/12/23 1114   Temp 36.6 °C (97.9 °F) 10/12/23 1114   Pulse 69 10/12/23 1502   Resp 16 10/12/23 1114   SpO2 97 % 10/12/23 1114         Event Time   Out of Recovery 10/11/2023 16:20:00         Pain/Aby Score: Pain Rating Prior to Med Admin: 3 (10/12/2023 12:42 PM)  Pain Rating Post Med Admin: 0 (10/12/2023 12:16 AM)  Aby Score: 10 (10/11/2023  2:45 PM)
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
## 2023-10-12 NOTE — PT/OT/SLP EVAL
Occupational Therapy   Co-Evaluation  Co-evaluation/treatment performed due to patient's multiple deficits requiring two skilled therapists to appropriately and safely assess patient's strength and endurance while facilitating functional tasks in addition to accommodating for patient's activity tolerance.        Name: Ramez Rand  MRN: [id number]  Admitting Diagnosis: Closed nondisplaced intertrochanteric fracture of left femur  Recent Surgery: Procedure(s) (LRB):  INSERTION, INTRAMEDULLARY SKY, FEMUR (Left) 1 Day Post-Op    Recommendations:     Discharge Recommendations: other (see comments)  Discharge Equipment Recommendations:  walker, rolling  Barriers to discharge:  None    Assessment:     Ramez Rand is a 58 y.o. male with a medical diagnosis of Closed nondisplaced intertrochanteric fracture of left femur.  He presents with the following performance deficits affecting function: impaired self care skills, impaired functional mobility, gait instability, decreased lower extremity function, pain. Pt participated in bed mobility, ADLs, & functional mobility. Pt ambulated using RW with SBA for approximately 140 feet with no LOB or SOB. However, pt remains limited in ADLs, functional mobility, and functional transfers and is currently not performing tasks at OF 2/2 pain. Pt would continue to benefit from skilled OT services to maximize functional independence with ADLs and functional mobility, reduce caregiver burden, and facilitate safe discharge in the least restrictive environment.     Rehab Prognosis: Good; patient would benefit from acute skilled OT services to address these deficits and reach maximum level of function.       Plan:     Patient to be seen daily to address the above listed problems via self-care/home management, therapeutic activities, therapeutic exercises, neuromuscular re-education  Plan of Care Expires: 11/12/23  Plan of Care Reviewed with: patient    Subjective     Chief Complaint: pain  "in the LLE during ambulation  Patient/Family Comments/goals: "I want to walk out a smoke a cigarette."    Occupational Profile:  Living Environment: Pt lives alone in a H with 2STE & a T/S combo.  Previous level of function: Independent with ADLs & functional mobilty  Roles and Routines: Pt works as a  and uses his bicycle for transportation  Equipment Used at Home: none  Assistance upon Discharge: Pt reports that he could call a friend for assistance if needed    Pain/Comfort:  Pain Rating 1: 9/10 (0/10 at rest; 9/10 with activity)  Location - Side 1: Left  Location - Orientation 1: generalized  Location 1: leg  Pain Addressed 1: Reposition, Distraction, Cessation of Activity  Pain Rating Post-Intervention 1: 9/10 (0/10 at rest; 9/10 with activity)    Patients cultural, spiritual, Sabianist conflicts given the current situation: no    Objective:     Communicated with: RN prior to session.  Patient found HOB elevated with perineural catheter, peripheral IV upon OT entry to room.    General Precautions: Standard, fall  Orthopedic Precautions: LLE partial weight bearing (ROMAT LLE)  Braces: N/A  Respiratory Status: Room air    Occupational Performance:    Bed Mobility:    Patient completed Supine to Sit with supervision    Functional Mobility/Transfers:  Patient completed Sit <> Stand Transfer with stand by assistance  with  rolling walker     Functional Mobility: pt participated in function mobility to assess UE/LE strength, gross coordination & topographic orientation required for participation/independence in ADLs & functional mobility.   Pt ambulated 140 ft using RW & SBA  No LOB or SOB noted  Activities of Daily Living:  Upper Body Dressing: set-up assistance to don hospital gown seated EOB  Pt required A w/ line management  Lower Body Dressing: stand by assistance to dof/don gripper socks while seated in chaire    Cognitive/Visual Perceptual:  Cognitive/Psychosocial Skills:     -       Oriented " to: Person, Place, Time, and Situation     Physical Exam:  Upper Extremity Range of Motion:     -       Right Upper Extremity: WFL  -       Left Upper Extremity: WFL  Upper Extremity Strength:    -       Right Upper Extremity: WFL  -       Left Upper Extremity: WFL   Strength:    -       Right Upper Extremity: WFL  -       Left Upper Extremity: WFL  Fine Motor Coordination:    -       Intact  Gross motor coordination:   WFL    AMPAC 6 Click ADL:  AMPAC Total Score: 21    Treatment & Education:  -Pt educated on hand placement for transfers  -Pt educated on RW placement for ADLs  -Pt educated on positioning of sx LE during performance of functional activities  -Pt educated on weightbearing and surgical precautions with pt verbalizing understanding  -Pt educated to call for assistance and to transfer with hospital staff only 2/2 line & pole management  -Pt educated on role of OT and plan of care s/p surgery, white board updated     Patient left up in chair with all lines intact, call button in reach, and RN notified    GOALS:   Multidisciplinary Problems       Occupational Therapy Goals          Problem: Occupational Therapy    Goal Priority Disciplines Outcome Interventions   Occupational Therapy Goal     OT, PT/OT Ongoing, Progressing    Description: Goals to be met by: 10/26/2023     Patient will increase functional independence with ADLs by performing:    UE Dressing with Modified [place].  LE Dressing with Modified [place].  Grooming while standing with Modified [place].  Toilet transfer to toilet with Modified [place].                         History:     Past Medical History:   Diagnosis Date    Arthritis     Hypertension          Past Surgical History:   Procedure Laterality Date    INTRAMEDULLARY RODDING OF FEMUR Left 10/11/2023    Procedure: INSERTION, INTRAMEDULLARY SKY, FEMUR;  Surgeon: Candelario Koenig MD;  Location: [facility] OR [address] [place] [address];  Service: Orthopedics;  Laterality: Left;        Time Tracking:     OT Date of Treatment: 10/12/23  OT Start Time: 0905  OT Stop Time: 0935  OT Total Time (min): 30 min    Billable Minutes:Evaluation 10  Therapeutic Activity 20    10/12/2023

## 2023-10-12 NOTE — SUBJECTIVE & OBJECTIVE
Interval history- patient feeling well this morning, still having some pain but improving, able to move his leg off the bed without pain he was having before. Was able to moblize at home with fx before he came in for 3 days so expect him to likely do well with PT/OT and anticipate outpatient PT/OT as has medicaid so options would be this vs IP rehab so will see how he does with therapy toay. Labs stable this morning. Hep C ab + and VL pending. He reports he is aware of exposure to hep C in the past, has not had treatment before for it. He is interested in receiving treatment for this and discussed that in the past treatmet was harsh and hard to treat but nowadays treatment is covered by insurance usually and much more likely to clear infection with pills to treat and so will awaiting VL to ensure is still active before placing a referral to hep C clinic as well as check genotype. WBAT now and anticoag until nov 16 after IM nail on 10/11 with ortho.    Review of patient's allergies indicates:  No Known Allergies    No current facility-administered medications on file prior to encounter.     No current outpatient medications on file prior to encounter.     Family History    None       Tobacco Use    Smoking status: Every Day     Current packs/day: 1.00     Types: Cigarettes    Smokeless tobacco: Not on file   Substance and Sexual Activity    Alcohol use: Yes     Comment: Daily beer and whiskkey    Drug use: Yes     Types: Marijuana, IV     Comment: last use 2 days ago    Sexual activity: Not on file     Review of Systems   Constitutional:  Negative for activity change, appetite change, chills and fever.   HENT:  Negative for congestion, hearing loss and rhinorrhea.    Eyes:  Negative for discharge, itching and visual disturbance.   Respiratory:  Negative for apnea, cough and shortness of breath.    Cardiovascular:  Negative for chest pain, palpitations and leg swelling.   Gastrointestinal:  Negative for abdominal  distention, abdominal pain, constipation, diarrhea, nausea and vomiting.   Endocrine: Negative for cold intolerance and heat intolerance.   Genitourinary:  Negative for dysuria and hematuria.   Musculoskeletal:  Negative for back pain, neck pain and neck stiffness.   Skin:  Negative for rash and wound.   Neurological:  Negative for dizziness, seizures, light-headedness and headaches.   Psychiatric/Behavioral:  Negative for agitation, confusion and suicidal ideas.      Objective:     Vital Signs (Most Recent):  Temp: 97.8 °F (36.6 °C) (10/12/23 0734)  Pulse: 61 (10/12/23 0734)  Resp: 18 (10/12/23 0840)  BP: 132/78 (10/12/23 0734)  SpO2: 98 % (10/12/23 0734) Vital Signs (24h Range):  Temp:  [97.4 °F (36.3 °C)-98.1 °F (36.7 °C)] 97.8 °F (36.6 °C)  Pulse:  [54-68] 61  Resp:  [10-21] 18  SpO2:  [94 %-100 %] 98 %  BP: (117-163)/(75-93) 132/78     Weight: 86.2 kg (190 lb)  Body mass index is 23.75 kg/m².     Physical Exam  Vitals reviewed.   Constitutional:       General: He is not in acute distress.     Appearance: He is well-developed.   HENT:      Head: Normocephalic and atraumatic.      Nose: Nose normal. No rhinorrhea.      Mouth/Throat:      Mouth: Mucous membranes are moist.   Eyes:      General: No scleral icterus.        Right eye: No discharge.         Left eye: No discharge.      Pupils: Pupils are equal, round, and reactive to light.   Neck:      Vascular: No JVD.   Cardiovascular:      Rate and Rhythm: Normal rate and regular rhythm.      Heart sounds: Normal heart sounds. No murmur heard.     No friction rub.   Pulmonary:      Effort: Pulmonary effort is normal. No respiratory distress.      Breath sounds: Normal breath sounds. No wheezing.   Abdominal:      General: Bowel sounds are normal. There is no distension.      Palpations: Abdomen is soft.      Tenderness: There is no abdominal tenderness.   Musculoskeletal:         General: No deformity.      Cervical back: Normal range of motion and neck supple.       Comments: Bandages to LLE with ropivicaine in place.   Skin:     General: Skin is warm and dry.   Neurological:      General: No focal deficit present.      Mental Status: He is alert and oriented to person, place, and time.   Psychiatric:         Mood and Affect: Mood normal.         Behavior: Behavior normal.              CRANIAL NERVES     CN III, IV, VI   Pupils are equal, round, and reactive to light.       Significant Labs: All pertinent labs within the past 24 hours have been reviewed.  CBC:   Recent Labs   Lab 10/11/23  0316 10/11/23  1534 10/12/23  0420   WBC 7.66 9.53 13.04*   HGB 12.7* 13.2* 13.6*   HCT 39.1* 40.3 41.5    269 281       CMP:   Recent Labs   Lab 10/11/23  0316 10/12/23  0420   * 136   K 4.5 4.7    104   CO2 22* 26   GLU 98 99   BUN 23* 15   CREATININE 0.8 0.7   CALCIUM 8.9 8.9   PROT 6.6  --    ALBUMIN 3.4*  --    BILITOT 0.7  --    ALKPHOS 69  --    AST 26  --    ALT 30  --    ANIONGAP 8 6*         Significant Imaging: I have reviewed all pertinent imaging results/findings within the past 24 hours.
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
## 2023-10-12 NOTE — ASSESSMENT & PLAN NOTE
Known hx to patient when asked given hep C Ab positive screen on admit, has not been treated in the past but is interested in being treated  -VL pending to ensure active and genotype ordered, if active then will refer to hep C clinic to initiate treatment

## 2023-10-12 NOTE — ASSESSMENT & PLAN NOTE
Na mildly low on admit but improved with IVF pre/post op and resolved now at 136 likely volume depleted on admit thtst resolved
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
## 2023-10-12 NOTE — SUBJECTIVE & OBJECTIVE
"Principal Problem:Closed nondisplaced intertrochanteric fracture of left femur    Principal Orthopedic Problem: as above; now s/p IMN 10/11    Interval History: Afebrile, VSS, NAEON.  Hgb stable at 13.6 on AM labs.  Pain has been reportedly well controlled, and patient was able to ambulate 140ft today with RW assistance.  Denies any numbness/tingling/feelings of weakness at his left lower extremity.            Review of patient's allergies indicates:  No Known Allergies    Current Facility-Administered Medications   Medication    acetaminophen tablet 1,000 mg    apixaban tablet 2.5 mg    bisacodyL suppository 10 mg    melatonin tablet 6 mg    methocarbamoL tablet 500 mg    mupirocin 2 % ointment 1 g    ondansetron disintegrating tablet 8 mg    ondansetron injection 4 mg    oxyCODONE immediate release tablet 5 mg    polyethylene glycol packet 17 g    ROPIvacaine (PF) 2 mg/ml (0.2%) solution    senna-docusate 8.6-50 mg per tablet 1 tablet    sodium chloride 0.9% flush 10 mL    sodium chloride 0.9% flush 10 mL    vitamin D 1000 units tablet 2,000 Units     Objective:     Vital Signs (Most Recent):  Temp: 97.9 °F (36.6 °C) (10/12/23 1114)  Pulse: 64 (10/12/23 1114)  Resp: 16 (10/12/23 1114)  BP: 133/78 (10/12/23 1114)  SpO2: 97 % (10/12/23 1114) Vital Signs (24h Range):  Temp:  [97.4 °F (36.3 °C)-98 °F (36.7 °C)] 97.9 °F (36.6 °C)  Pulse:  [54-64] 64  Resp:  [14-21] 16  SpO2:  [94 %-100 %] 97 %  BP: (132-163)/(78-93) 133/78     Weight: 86.2 kg (190 lb)  Height: 6' 3" (190.5 cm)  Body mass index is 23.75 kg/m².      Intake/Output Summary (Last 24 hours) at 10/12/2023 1445  Last data filed at 10/12/2023 0526  Gross per 24 hour   Intake --   Output 2300 ml   Net -2300 ml        Ortho/SPM Exam  AAOx4  NAD  Reg rate  No increased WOB    LLE:  Dressing c/d/i  SILT T/SP/DP/[place]/Sa  Motor intact T/SP/DP  WWP extremities  FCDs in place and functioning      Significant Labs: All pertinent labs within the past 24 hours have been " reviewed.    Significant Imaging: I have reviewed all pertinent imaging results/findings.

## 2023-10-12 NOTE — PLAN OF CARE
[facility] - [facility]  Initial Discharge Assessment       Primary Care Provider: No, Primary Doctor    Admission Diagnosis: Preop cardiovascular exam [Z01.810]  Hip injury, left, initial encounter [S74.645W]  Closed 2-part intertrochanteric fracture of left femur, initial encounter [S72.142A]  Intertrochanteric fracture of left femur [S72.142A]    Admission Date: 10/10/2023  Expected Discharge Date: 10/13/2023    Transition of Care Barriers: None    Payor: MEDICAID / Plan: [facility] / Product Type: Managed Medicaid /     No emergency contact information on file.    Discharge Plan A: Home  Discharge Plan B: Home    No Pharmacies Listed    Initial Assessment (most recent)       Adult Discharge Assessment - 10/12/23 1551          Discharge Assessment    Assessment Type Discharge Planning Assessment     Confirmed/corrected address, phone number and insurance Yes     Confirmed Demographics Correct on Facesheet     Source of Information patient     Communicated JANNET with patient/caregiver Yes     People in Home alone     Do you expect to return to your current living situation? Yes     Prior to hospitilization cognitive status: Alert/Oriented     Current cognitive status: Alert/Oriented     Equipment Currently Used at Home none     Do you currently have service(s) that help you manage your care at home? No     Do you take prescription medications? No     Do you have prescription coverage? Yes     Do you have any problems affording any of your prescribed medications? No     How do you get to doctors appointments? health plan transportation     Are you on dialysis? No     Do you take coumadin? No     DME Needed Upon Discharge  walker, rolling     Discharge Plan discussed with: Patient     Transition of Care Barriers None     Discharge Plan A Home     Discharge Plan B Home        Physical Activity    On average, how many days per week do you engage in moderate to strenuous exercise (like a brisk walk)? 7  days     On average, how many minutes do you engage in exercise at this level? 60 min        Financial Resource Strain    How hard is it for you to pay for the very basics like food, housing, medical care, and heating? Not hard at all        Housing Stability    In the last 12 months, was there a time when you were not able to pay the mortgage or rent on time? No     In the last 12 months, was there a time when you did not have a steady place to sleep or slept in a shelter (including now)? No        Transportation Needs    In the past 12 months, has lack of transportation kept you from medical appointments or from getting medications? No        Food Insecurity    Within the past 12 months, you worried that your food would run out before you got the money to buy more. Never true     Within the past 12 months, the food you bought just didn't last and you didn't have money to get more. Never true        Stress    Do you feel stress - tense, restless, nervous, or anxious, or unable to sleep at night because your mind is troubled all the time - these days? Not at all        Social Connections    In a typical week, how many times do you talk on the phone with family, friends, or neighbors? More than three times a week     How often do you get together with friends or relatives? More than three times a week     How often do you attend Alevism or Jehovah's witness services? Never     Do you belong to any clubs or organizations such as Alevism groups, unions, fraternal or athletic groups, or school groups? No     Are you , , , , never , or living with a partner?         Alcohol Use    Q1: How often do you have a drink containing alcohol? 4 or more times a week     Q2: How many drinks containing alcohol do you have on a typical day when you are drinking? 5 or 6     Q3: How often do you have six or more drinks on one occasion? Daily or almost daily                   Patient lives alone in a  single story home with two entry steps. Patient does not feel he will need any post acute services upon hospital discharge. Plan is to attend Outpatient Rehab.  Patient states he may need medicaid transportation home.
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
## 2023-10-12 NOTE — PLAN OF CARE
Problem: Adjustment to Injury (Hip Fracture Medical Management)  Goal: Optimal Coping with Change in Health Status  Outcome: Ongoing, Progressing     Problem: Bleeding (Hip Fracture Medical Management)  Goal: Absence of Bleeding  Outcome: Ongoing, Progressing     Problem: Bowel Elimination Impaired (Hip Fracture Medical Management)  Goal: Effective Bowel Elimination  Outcome: Ongoing, Progressing     Problem: Cognitive Decline Risk (Hip Fracture Medical Management)  Goal: Baseline Cognitive Function Maintained  Outcome: Ongoing, Progressing     Problem: Embolism (Hip Fracture Medical Management)  Goal: Absence of Embolism  Outcome: Ongoing, Progressing     Problem: Fracture Stabilization and Management (Hip Fracture Medical Management)  Goal: Fracture Stability  Outcome: Ongoing, Progressing     Problem: Pain (Hip Fracture Medical Management)  Goal: Acceptable Pain Level  Outcome: Ongoing, Progressing

## 2023-10-12 NOTE — TELEPHONE ENCOUNTER
Dr. Janeth Cohen ordered that patient be scheduled for a hepatology consult visit for hep c.  Patient hep c quant positive and currently hospitalized.  Appt with PA Scheuermann scheduled 11/9/23; reminder notice mailed.
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
## 2023-10-13 VITALS
WEIGHT: 190 LBS | DIASTOLIC BLOOD PRESSURE: 88 MMHG | HEIGHT: 75 IN | OXYGEN SATURATION: 94 % | RESPIRATION RATE: 16 BRPM | SYSTOLIC BLOOD PRESSURE: 132 MMHG | HEART RATE: 61 BPM | BODY MASS INDEX: 23.62 KG/M2 | TEMPERATURE: 98 F

## 2023-10-13 LAB
ANION GAP SERPL CALC-SCNC: 9 MMOL/L (ref 8–16)
BASOPHILS # BLD AUTO: 0.07 K/UL (ref 0–0.2)
BASOPHILS NFR BLD: 0.7 % (ref 0–1.9)
BUN SERPL-MCNC: 17 MG/DL (ref 6–20)
CALCIUM SERPL-MCNC: 9 MG/DL (ref 8.7–10.5)
CHLORIDE SERPL-SCNC: 104 MMOL/L (ref 95–110)
CO2 SERPL-SCNC: 26 MMOL/L (ref 23–29)
CREAT SERPL-MCNC: 0.8 MG/DL (ref 0.5–1.4)
DIFFERENTIAL METHOD: ABNORMAL
EOSINOPHIL # BLD AUTO: 0.3 K/UL (ref 0–0.5)
EOSINOPHIL NFR BLD: 3.2 % (ref 0–8)
ERYTHROCYTE [DISTWIDTH] IN BLOOD BY AUTOMATED COUNT: 13.6 % (ref 11.5–14.5)
EST. GFR  (NO RACE VARIABLE): >60 ML/MIN/1.73 M^2
GLUCOSE SERPL-MCNC: 83 MG/DL (ref 70–110)
HCT VFR BLD AUTO: 37.8 % (ref 40–54)
HGB BLD-MCNC: 12.2 G/DL (ref 14–18)
IMM GRANULOCYTES # BLD AUTO: 0.05 K/UL (ref 0–0.04)
IMM GRANULOCYTES NFR BLD AUTO: 0.5 % (ref 0–0.5)
LYMPHOCYTES # BLD AUTO: 3.5 K/UL (ref 1–4.8)
LYMPHOCYTES NFR BLD: 35.5 % (ref 18–48)
MAGNESIUM SERPL-MCNC: 1.8 MG/DL (ref 1.6–2.6)
MCH RBC QN AUTO: 29.9 PG (ref 27–31)
MCHC RBC AUTO-ENTMCNC: 32.3 G/DL (ref 32–36)
MCV RBC AUTO: 93 FL (ref 82–98)
MONOCYTES # BLD AUTO: 0.6 K/UL (ref 0.3–1)
MONOCYTES NFR BLD: 6.2 % (ref 4–15)
NEUTROPHILS # BLD AUTO: 5.4 K/UL (ref 1.8–7.7)
NEUTROPHILS NFR BLD: 53.9 % (ref 38–73)
NRBC BLD-RTO: 0 /100 WBC
PHOSPHATE SERPL-MCNC: 3.4 MG/DL (ref 2.7–4.5)
PLATELET # BLD AUTO: 281 K/UL (ref 150–450)
PMV BLD AUTO: 10.2 FL (ref 9.2–12.9)
POTASSIUM SERPL-SCNC: 4.4 MMOL/L (ref 3.5–5.1)
RBC # BLD AUTO: 4.08 M/UL (ref 4.6–6.2)
SODIUM SERPL-SCNC: 139 MMOL/L (ref 136–145)
WBC # BLD AUTO: 9.96 K/UL (ref 3.9–12.7)

## 2023-10-13 PROCEDURE — 25000003 PHARM REV CODE 250: Performed by: INTERNAL MEDICINE

## 2023-10-13 PROCEDURE — 84100 ASSAY OF PHOSPHORUS: CPT | Performed by: INTERNAL MEDICINE

## 2023-10-13 PROCEDURE — 97116 GAIT TRAINING THERAPY: CPT | Mod: CQ

## 2023-10-13 PROCEDURE — 83735 ASSAY OF MAGNESIUM: CPT | Performed by: INTERNAL MEDICINE

## 2023-10-13 PROCEDURE — 99239 HOSP IP/OBS DSCHRG MGMT >30: CPT | Mod: ,,, | Performed by: HOSPITALIST

## 2023-10-13 PROCEDURE — 99239 PR HOSPITAL DISCHARGE DAY,>30 MIN: ICD-10-PCS | Mod: ,,, | Performed by: HOSPITALIST

## 2023-10-13 PROCEDURE — 99231 PR SUBSEQUENT HOSPITAL CARE,LEVL I: ICD-10-PCS | Mod: ,,, | Performed by: SURGERY

## 2023-10-13 PROCEDURE — 36415 COLL VENOUS BLD VENIPUNCTURE: CPT | Performed by: HOSPITALIST

## 2023-10-13 PROCEDURE — 25000003 PHARM REV CODE 250: Performed by: STUDENT IN AN ORGANIZED HEALTH CARE EDUCATION/TRAINING PROGRAM

## 2023-10-13 PROCEDURE — 85025 COMPLETE CBC W/AUTO DIFF WBC: CPT | Performed by: INTERNAL MEDICINE

## 2023-10-13 PROCEDURE — 80048 BASIC METABOLIC PNL TOTAL CA: CPT | Performed by: INTERNAL MEDICINE

## 2023-10-13 PROCEDURE — 63600175 PHARM REV CODE 636 W HCPCS: Performed by: INTERNAL MEDICINE

## 2023-10-13 PROCEDURE — 87902 NFCT AGT GNTYP ALYS HEP C: CPT | Performed by: HOSPITALIST

## 2023-10-13 PROCEDURE — 99231 SBSQ HOSP IP/OBS SF/LOW 25: CPT | Mod: ,,, | Performed by: SURGERY

## 2023-10-13 RX ORDER — METHOCARBAMOL 750 MG/1
750 TABLET, FILM COATED ORAL EVERY 8 HOURS
Status: DISCONTINUED | OUTPATIENT
Start: 2023-10-13 | End: 2023-10-13 | Stop reason: HOSPADM

## 2023-10-13 RX ADMIN — POLYETHYLENE GLYCOL 3350 17 G: 17 POWDER, FOR SOLUTION ORAL at 08:10

## 2023-10-13 RX ADMIN — ROPIVACAINE HYDROCHLORIDE 0.1 ML/HR: 2 INJECTION, SOLUTION EPIDURAL; INFILTRATION at 04:10

## 2023-10-13 RX ADMIN — METHOCARBAMOL 750 MG: 750 TABLET ORAL at 08:10

## 2023-10-13 RX ADMIN — ACETAMINOPHEN 1000 MG: 500 TABLET ORAL at 05:10

## 2023-10-13 RX ADMIN — OXYCODONE HYDROCHLORIDE 5 MG: 5 TABLET ORAL at 04:10

## 2023-10-13 RX ADMIN — CHOLECALCIFEROL TAB 25 MCG (1000 UNIT) 2000 UNITS: 25 TAB at 08:10

## 2023-10-13 RX ADMIN — SENNOSIDES AND DOCUSATE SODIUM 1 TABLET: 50; 8.6 TABLET ORAL at 08:10

## 2023-10-13 RX ADMIN — APIXABAN 2.5 MG: 2.5 TABLET, FILM COATED ORAL at 08:10
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
## 2023-10-13 NOTE — PLAN OF CARE
Problem: Adjustment to Injury (Hip Fracture Medical Management)  Goal: Optimal Coping with Change in Health Status  Outcome: Ongoing, Progressing     Problem: Bleeding (Hip Fracture Medical Management)  Goal: Absence of Bleeding  Outcome: Ongoing, Progressing     Problem: Bowel Elimination Impaired (Hip Fracture Medical Management)  Goal: Effective Bowel Elimination  Outcome: Ongoing, Progressing     Problem: Cognitive Decline Risk (Hip Fracture Medical Management)  Goal: Baseline Cognitive Function Maintained  Outcome: Ongoing, Progressing     Problem: Pain (Hip Fracture Medical Management)  Goal: Acceptable Pain Level  Outcome: Ongoing, Progressing     Problem: Urinary Elimination Impaired (Hip Fracture Medical Management)  Goal: Effective Urinary Elimination  Outcome: Ongoing, Progressing     Problem: Fluid and Electrolyte Imbalance (Surgery Nonspecified)  Goal: Fluid and Electrolyte Balance  Outcome: Ongoing, Progressing     Problem: Infection (Surgery Nonspecified)  Goal: Absence of Infection Signs and Symptoms  Outcome: Ongoing, Progressing     Problem: Postoperative Nausea and Vomiting (Surgery Nonspecified)  Goal: Nausea and Vomiting Relief  Outcome: Ongoing, Progressing     Problem: Postoperative Urinary Retention (Surgery Nonspecified)  Goal: Effective Urinary Elimination  Outcome: Ongoing, Progressing     Problem: Infection (Anesthesia/Analgesia, Neuraxial)  Goal: Absence of Infection Signs and Symptoms  Outcome: Ongoing, Progressing     Problem: Pain (Anesthesia/Analgesia, Neuraxial)  Goal: Effective Pain Control  Outcome: Ongoing, Progressing     Problem: Fall Injury Risk  Goal: Absence of Fall and Fall-Related Injury  Outcome: Ongoing, Progressing     Problem: Adult Inpatient Plan of Care  Goal: Optimal Comfort and Wellbeing  Outcome: Ongoing, Progressing  Goal: Readiness for Transition of Care  Outcome: Ongoing, Progressing
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
## 2023-10-13 NOTE — NURSING
Discharge instruction and education provided. Patient voices understanding. IV site removed via patient. Patient shows no acute distress. Patient stated that he will  his medication when his boss gives him the money to pay for it.He refused Medicaid transportation and he will find a ride home. Rolling walker delivered to the bedside.   
Pt arrived back to floor from surgery, AAOX4, slightly drowsy. Complaints of pain. Incision clean, dry,intact. Gauze/ tegaderm x3. PNC in place. Fall and safety measures in place.    
Pt is in Bed AAOX4 with no needs at this time. LLE dressing intact, bed lowered, and pain controlled.   
Pt is in bed AAOX4, LLE dressing intact, PCN still infusing, and pain controlled. No needs at this time.   
Pt transported to surgery.   
Pt. Arrived on the unit via wheelchair, alert and oriented x4 nad noted, pt. Denies any discomforts... oriented to unit and room equipment. Vital signs stable...   
Jose Antonio Hanson

## 2023-10-13 NOTE — PLAN OF CARE
Problem: Adjustment to Injury (Hip Fracture Medical Management)  Goal: Optimal Coping with Change in Health Status  Outcome: Ongoing, Progressing     Problem: Bleeding (Hip Fracture Medical Management)  Goal: Absence of Bleeding  Outcome: Ongoing, Progressing     Problem: Bowel Elimination Impaired (Hip Fracture Medical Management)  Goal: Effective Bowel Elimination  Outcome: Ongoing, Progressing     Problem: Cognitive Decline Risk (Hip Fracture Medical Management)  Goal: Baseline Cognitive Function Maintained  Outcome: Ongoing, Progressing     Problem: Embolism (Hip Fracture Medical Management)  Goal: Absence of Embolism  Outcome: Ongoing, Progressing     Problem: Fracture Stabilization and Management (Hip Fracture Medical Management)  Goal: Fracture Stability  Outcome: Ongoing, Progressing     Problem: Functional Ability Impaired (Hip Fracture Medical Management)  Goal: Optimal Functional Performance  Outcome: Ongoing, Progressing     Problem: Pain (Hip Fracture Medical Management)  Goal: Acceptable Pain Level  Outcome: Ongoing, Progressing

## 2023-10-13 NOTE — PT/OT/SLP PROGRESS
Physical Therapy Treatment    Patient Name:  Ramez Rand   MRN:  [id number]    Recommendations:     Discharge Recommendations: other (see comments)  Discharge Equipment Recommendations: walker, rolling  Barriers to discharge: None    Assessment:     Ramez Rand is a 58 y.o. male admitted with a medical diagnosis of Closed nondisplaced intertrochanteric fracture of left femur.  He presents with the following impairments/functional limitations: weakness, impaired endurance, gait instability, decreased ROM, orthopedic precautions. Pt with improved ambulation with minimal gait deficits.     Rehab Prognosis: Good; patient would benefit from acute skilled PT services to address these deficits and reach maximum level of function.    Recent Surgery: Procedure(s) (LRB):  INSERTION, INTRAMEDULLARY SKY, FEMUR (Left) 2 Days Post-Op    Plan:     During this hospitalization, patient to be seen daily to address the identified rehab impairments via gait training, therapeutic activities, therapeutic exercises, neuromuscular re-education and progress toward the following goals:    Plan of Care Expires:  11/11/23    Subjective     Chief Complaint: none   Patient/Family Comments/goals: go home   Pain/Comfort:  Pain Rating 1: 0/10      Objective:     Communicated with RN prior to session.  Patient found  on couch in room  with peripheral IV upon PT entry to room.     General Precautions: Standard, fall  Orthopedic Precautions: LLE weight bearing as tolerated (LLE ROM as tolerated)  Braces: N/A  Respiratory Status: Room air     Functional Mobility:  Transfers:     Sit to Stand:  supervision with no AD  Gait: 300 feet SBA with antalgic gait with decreased step length on RLE       AM-PAC 6 CLICK MOBILITY  Turning over in bed (including adjusting bedclothes, sheets and blankets)?: 4  Sitting down on and standing up from a chair with arms (e.g., wheelchair, bedside commode, etc.): 4  Moving from lying on back to sitting on the side of  the bed?: 4  Moving to and from a bed to a chair (including a wheelchair)?: 4  Need to walk in hospital room?: 4  Climbing 3-5 steps with a railing?: 3  Basic Mobility Total Score: 23       Treatment & Education:  Pt education provided on home safety, calling for assistance, and AD use.     Bedside table in front of patient and area set up for function, convenience, and safety. RN aware of patient's mobility needs and status. Questions/concerns addressed within PTA scope of practice; patient  with no further questions. Time was provided for active listening, discussion of health disposition, and discussion of safe discharge.    Patient left  on couch  with all lines intact and call button in reach..    GOALS:   Multidisciplinary Problems       Physical Therapy Goals          Problem: Physical Therapy    Goal Priority Disciplines Outcome Goal Variances Interventions   Physical Therapy Goal     PT, PT/OT Ongoing, Progressing     Description: Goals to be met by: 10/26/23     Patient will increase functional independence with mobility by performin. Supine to sit with Modified Allison - Not met  2. Sit to stand transfer with Modified Allison with RW - Not met  3. Gait  x 200 feet with Supervision using Rolling Walker - Not met  4. Ascend/descend 4 stairs (in therapy gym) with a unilateral Handrail with Stand-by Assistance using No Assistive Device - Not met                       Time Tracking:     PT Received On: 10/13/23  PT Start Time: 904     PT Stop Time: 912  PT Total Time (min): 8 min     Billable Minutes: Gait Training 8    Treatment Type: Treatment  PT/PTA: PTA     Number of PTA visits since last PT visit: 1     10/13/2023

## 2023-10-13 NOTE — ADDENDUM NOTE
Addendum  created 10/13/23 0802 by Pepito Chavez MD    Clinical Note Signed, Order list changed, Pharmacy for encounter modified

## 2023-10-14 ENCOUNTER — NURSE TRIAGE (OUTPATIENT)
Dept: ADMINISTRATIVE | Facility: CLINIC | Age: 58
End: 2023-10-14
Payer: MEDICAID

## 2023-10-14 NOTE — TELEPHONE ENCOUNTER
Pt calling with request to transfer recent medication (Eliquis /pain medication) to [facility]. Was under the impression [facility] was closed. I have informed him they are open. He will try to have the medication picked up from this pharmacy. I have also paged/secure chat with Ortho. Will update patient if I receive further information.     Reason for Disposition   [1] Caller has URGENT medicine question about med that PCP or specialist prescribed AND [2] triager unable to answer question    Protocols used: Medication Question Call-A-AH

## 2023-10-15 LAB
BLD PROD TYP BPU: NORMAL
BLOOD UNIT EXPIRATION DATE: NORMAL
BLOOD UNIT TYPE CODE: 5100
BLOOD UNIT TYPE: NORMAL
CODING SYSTEM: NORMAL
CROSSMATCH INTERPRETATION: NORMAL
DISPENSE STATUS: NORMAL
NUM UNITS TRANS PACKED RBC: NORMAL

## 2023-10-17 ENCOUNTER — PATIENT OUTREACH (OUTPATIENT)
Dept: ADMINISTRATIVE | Facility: CLINIC | Age: 58
End: 2023-10-17
Payer: MEDICAID

## 2023-10-17 RX ORDER — METHOCARBAMOL 500 MG/1
500 TABLET, FILM COATED ORAL EVERY 6 HOURS PRN
Qty: 60 TABLET | Refills: 1 | Status: SHIPPED | OUTPATIENT
Start: 2023-10-17 | End: 2024-02-05

## 2023-10-17 RX ORDER — METHOCARBAMOL 500 MG/1
500 TABLET, FILM COATED ORAL EVERY 6 HOURS PRN
Qty: 60 TABLET | Refills: 1 | Status: SHIPPED | OUTPATIENT
Start: 2023-10-17 | End: 2023-10-17 | Stop reason: SDUPTHER

## 2023-10-17 RX ORDER — OXYCODONE HYDROCHLORIDE 5 MG/1
5 TABLET ORAL EVERY 6 HOURS PRN
Qty: 28 TABLET | Refills: 0 | Status: SHIPPED | OUTPATIENT
Start: 2023-10-17 | End: 2023-11-01 | Stop reason: SDUPTHER

## 2023-10-17 RX ORDER — CHOLECALCIFEROL (VITAMIN D3) 50 MCG
2000 TABLET ORAL DAILY
Qty: 60 TABLET | Refills: 2 | Status: SHIPPED | OUTPATIENT
Start: 2023-10-17

## 2023-10-17 RX ORDER — AMOXICILLIN 250 MG
1 CAPSULE ORAL 2 TIMES DAILY
Qty: 60 TABLET | Refills: 0 | Status: SHIPPED | OUTPATIENT
Start: 2023-10-17 | End: 2023-10-17 | Stop reason: SDUPTHER

## 2023-10-17 RX ORDER — AMOXICILLIN 250 MG
1 CAPSULE ORAL 2 TIMES DAILY
Qty: 60 TABLET | Refills: 0 | Status: SHIPPED | OUTPATIENT
Start: 2023-10-17

## 2023-10-17 RX ORDER — OXYCODONE HYDROCHLORIDE 5 MG/1
5 TABLET ORAL EVERY 6 HOURS PRN
Qty: 28 TABLET | Refills: 0 | Status: SHIPPED | OUTPATIENT
Start: 2023-10-17 | End: 2023-10-17 | Stop reason: SDUPTHER

## 2023-10-17 RX ORDER — ACETAMINOPHEN 500 MG
1000 TABLET ORAL EVERY 8 HOURS
Refills: 0
Start: 2023-10-17

## 2023-10-17 RX ORDER — CHOLECALCIFEROL (VITAMIN D3) 50 MCG
2000 TABLET ORAL DAILY
Qty: 60 TABLET | Refills: 2 | Status: SHIPPED | OUTPATIENT
Start: 2023-10-17 | End: 2023-10-17 | Stop reason: SDUPTHER

## 2023-10-17 NOTE — PROGRESS NOTES
C3 nurse spoke with Ramez Rand for a TCC post hospital discharge follow up call. The patient reports does not have a scheduled HOSFU appointment. C3 nurse was unable to schedule HOSFU appointment for [facility]. Patient advised to contact their PCP to schedule a HOSPFU within 5-7 days.

## 2023-10-19 LAB
HCV GENTYP SERPL NAA+PROBE: ABNORMAL
HCV RNA SERPL NAA+PROBE-LOG IU: 5.85 LOG (10) IU/ML
HCV RNA SERPL QL NAA+PROBE: DETECTED
HCV RNA SPEC NAA+PROBE-ACNC: ABNORMAL IU/ML

## 2023-10-19 NOTE — PLAN OF CARE
[facility]  Discharge Final Note    Primary Care Provider: No, Primary Doctor    Expected Discharge Date: 10/13/2023    Final Discharge Note (most recent)       Final Note - 10/13/23 1139          Final Note    Assessment Type Final Discharge Note     Anticipated Discharge Disposition Home or Self Care     Hospital Resources/Appts/Education Provided Provided patient/caregiver with written discharge plan information;Provided education on problems/symptoms using teachback                   Future Appointments   Date Time Provider Department Center   10/25/2023 12:15 PM Margoth Grey PA-C [facility] [address]   11/9/2023  1:00 PM Scheuermann, Jennifer B., PA [facility] [address]   11/22/2023 10:00 AM Margoth Grey PA-C [facility] [address]        Contact Info       [facility] 5th Fl   Specialty: Orthopedics    [address]  [facility] [address]   Phone: [phone]       Next Steps: Follow up    Instructions: 2 weeks for follow up. keep bandages in place until follow up, do not get wet or immerse in water.    [facility] Transplant 1st Fl   Specialty: Hepatology    [address]  [place] [address]   Phone: [phone]       Next Steps: Follow up    Instructions: hep C clinic for follow up - scheduled for appointment on november 9th at 1 pm.

## 2023-10-25 ENCOUNTER — TELEPHONE (OUTPATIENT)
Dept: ORTHOPEDICS | Facility: CLINIC | Age: 58
End: 2023-10-25

## 2023-10-25 NOTE — TELEPHONE ENCOUNTER
[phone].  Left message for patient to return call.  Regarding missed appointment on 10/25/23. Pt need to be reschedule.

## 2023-11-01 RX ORDER — OXYCODONE HYDROCHLORIDE 5 MG/1
5 TABLET ORAL EVERY 6 HOURS PRN
Qty: 28 TABLET | Refills: 0 | OUTPATIENT
Start: 2023-11-01

## 2023-11-01 RX ORDER — OXYCODONE HYDROCHLORIDE 5 MG/1
5 TABLET ORAL EVERY 6 HOURS PRN
Qty: 28 TABLET | Refills: 0 | Status: SHIPPED | OUTPATIENT
Start: 2023-11-01

## 2023-11-09 ENCOUNTER — TELEPHONE (OUTPATIENT)
Dept: HEPATOLOGY | Facility: CLINIC | Age: 58
End: 2023-11-09
Payer: MEDICAID

## 2023-11-09 NOTE — TELEPHONE ENCOUNTER
I spoke with patient on 11/6/23 and he confirmed scheduled appt with PA Scheuermann on 11/9/23.  He was a no-show today.  Letter sent has that he contact hepatology for rescheduling.

## 2023-12-14 ENCOUNTER — HOSPITAL ENCOUNTER (EMERGENCY)
Facility: HOSPITAL | Age: 58
Discharge: HOME | End: 2023-12-15
Attending: EMERGENCY MEDICINE
Payer: MEDICAID

## 2023-12-14 ENCOUNTER — HOSPITAL ENCOUNTER (EMERGENCY)
Facility: HOSPITAL | Age: 58
Discharge: LEFT BEFORE DISCHARGE | End: 2023-12-14
Payer: MEDICAID

## 2023-12-14 VITALS
WEIGHT: 182 LBS | SYSTOLIC BLOOD PRESSURE: 214 MMHG | OXYGEN SATURATION: 97 % | HEIGHT: 75 IN | TEMPERATURE: 99 F | DIASTOLIC BLOOD PRESSURE: 120 MMHG | BODY MASS INDEX: 22.63 KG/M2 | HEART RATE: 72 BPM | RESPIRATION RATE: 20 BRPM

## 2023-12-14 DIAGNOSIS — K04.7 DENTAL INFECTION: ICD-10-CM

## 2023-12-14 DIAGNOSIS — R03.0 ELEVATED BLOOD PRESSURE READING: ICD-10-CM

## 2023-12-14 DIAGNOSIS — M79.605 LEFT LEG PAIN: Primary | ICD-10-CM

## 2023-12-14 DIAGNOSIS — Z87.81 HISTORY OF FRACTURE OF FEMUR: ICD-10-CM

## 2023-12-14 DIAGNOSIS — M71.21 POPLITEAL CYST, RIGHT: ICD-10-CM

## 2023-12-14 LAB
ALBUMIN SERPL BCP-MCNC: 3.7 G/DL (ref 3.5–5.2)
ALP SERPL-CCNC: 105 U/L (ref 55–135)
ALT SERPL W/O P-5'-P-CCNC: 22 U/L (ref 10–44)
ANION GAP SERPL CALC-SCNC: 9 MMOL/L (ref 8–16)
AST SERPL-CCNC: 23 U/L (ref 10–40)
BASOPHILS # BLD AUTO: 0.11 K/UL (ref 0–0.2)
BASOPHILS NFR BLD: 1 % (ref 0–1.9)
BILIRUB SERPL-MCNC: 0.9 MG/DL (ref 0.1–1)
BUN SERPL-MCNC: 17 MG/DL (ref 6–20)
CALCIUM SERPL-MCNC: 9.5 MG/DL (ref 8.7–10.5)
CHLORIDE SERPL-SCNC: 105 MMOL/L (ref 95–110)
CO2 SERPL-SCNC: 24 MMOL/L (ref 23–29)
CREAT SERPL-MCNC: 0.8 MG/DL (ref 0.5–1.4)
DIFFERENTIAL METHOD: ABNORMAL
EOSINOPHIL # BLD AUTO: 0.4 K/UL (ref 0–0.5)
EOSINOPHIL NFR BLD: 3.3 % (ref 0–8)
ERYTHROCYTE [DISTWIDTH] IN BLOOD BY AUTOMATED COUNT: 13.7 % (ref 11.5–14.5)
EST. GFR  (NO RACE VARIABLE): >60 ML/MIN/1.73 M^2
GLUCOSE SERPL-MCNC: 101 MG/DL (ref 70–110)
HCT VFR BLD AUTO: 45.2 % (ref 40–54)
HGB BLD-MCNC: 14.3 G/DL (ref 14–18)
IMM GRANULOCYTES # BLD AUTO: 0.03 K/UL (ref 0–0.04)
IMM GRANULOCYTES NFR BLD AUTO: 0.3 % (ref 0–0.5)
LYMPHOCYTES # BLD AUTO: 2.7 K/UL (ref 1–4.8)
LYMPHOCYTES NFR BLD: 24.6 % (ref 18–48)
MCH RBC QN AUTO: 30.5 PG (ref 27–31)
MCHC RBC AUTO-ENTMCNC: 31.6 G/DL (ref 32–36)
MCV RBC AUTO: 96 FL (ref 82–98)
MONOCYTES # BLD AUTO: 0.9 K/UL (ref 0.3–1)
MONOCYTES NFR BLD: 8.5 % (ref 4–15)
NEUTROPHILS # BLD AUTO: 6.9 K/UL (ref 1.8–7.7)
NEUTROPHILS NFR BLD: 62.3 % (ref 38–73)
NRBC BLD-RTO: 0 /100 WBC
PLATELET # BLD AUTO: 271 K/UL (ref 150–450)
PMV BLD AUTO: 8.9 FL (ref 9.2–12.9)
POTASSIUM SERPL-SCNC: 3.9 MMOL/L (ref 3.5–5.1)
PROT SERPL-MCNC: 7.4 G/DL (ref 6–8.4)
RBC # BLD AUTO: 4.69 M/UL (ref 4.6–6.2)
SODIUM SERPL-SCNC: 138 MMOL/L (ref 136–145)
WBC # BLD AUTO: 11.12 K/UL (ref 3.9–12.7)

## 2023-12-14 PROCEDURE — 63600175 PHARM REV CODE 636 W HCPCS: Performed by: PHYSICIAN ASSISTANT

## 2023-12-14 PROCEDURE — 80053 COMPREHEN METABOLIC PANEL: CPT | Performed by: EMERGENCY MEDICINE

## 2023-12-14 PROCEDURE — 85025 COMPLETE CBC W/AUTO DIFF WBC: CPT | Performed by: PHYSICIAN ASSISTANT

## 2023-12-14 PROCEDURE — 96374 THER/PROPH/DIAG INJ IV PUSH: CPT

## 2023-12-14 PROCEDURE — 99285 EMERGENCY DEPT VISIT HI MDM: CPT | Mod: 25,27

## 2023-12-14 PROCEDURE — 99281 EMR DPT VST MAYX REQ PHY/QHP: CPT | Mod: 25,ER

## 2023-12-14 RX ORDER — KETOROLAC TROMETHAMINE 30 MG/ML
10 INJECTION, SOLUTION INTRAMUSCULAR; INTRAVENOUS
Status: COMPLETED | OUTPATIENT
Start: 2023-12-14 | End: 2023-12-14

## 2023-12-14 RX ADMIN — KETOROLAC TROMETHAMINE 10 MG: 30 INJECTION, SOLUTION INTRAMUSCULAR; INTRAVENOUS at 10:12

## 2023-12-15 VITALS
TEMPERATURE: 98 F | SYSTOLIC BLOOD PRESSURE: 158 MMHG | RESPIRATION RATE: 16 BRPM | OXYGEN SATURATION: 98 % | DIASTOLIC BLOOD PRESSURE: 86 MMHG | HEART RATE: 74 BPM

## 2023-12-15 PROBLEM — M70.62 TROCHANTERIC BURSITIS OF LEFT HIP: Status: ACTIVE | Noted: 2023-12-15

## 2023-12-15 LAB
CRP SERPL-MCNC: 7.4 MG/L (ref 0–8.2)
ERYTHROCYTE [SEDIMENTATION RATE] IN BLOOD BY PHOTOMETRIC METHOD: 25 MM/HR (ref 0–23)

## 2023-12-15 PROCEDURE — 25000003 PHARM REV CODE 250: Performed by: EMERGENCY MEDICINE

## 2023-12-15 PROCEDURE — 85652 RBC SED RATE AUTOMATED: CPT | Performed by: EMERGENCY MEDICINE

## 2023-12-15 PROCEDURE — A9585 GADOBUTROL INJECTION: HCPCS | Performed by: EMERGENCY MEDICINE

## 2023-12-15 PROCEDURE — 25000003 PHARM REV CODE 250: Performed by: PHYSICIAN ASSISTANT

## 2023-12-15 PROCEDURE — 96376 TX/PRO/DX INJ SAME DRUG ADON: CPT | Mod: 59

## 2023-12-15 PROCEDURE — 25500020 PHARM REV CODE 255: Performed by: EMERGENCY MEDICINE

## 2023-12-15 PROCEDURE — 86140 C-REACTIVE PROTEIN: CPT | Performed by: EMERGENCY MEDICINE

## 2023-12-15 PROCEDURE — 63600175 PHARM REV CODE 636 W HCPCS: Performed by: EMERGENCY MEDICINE

## 2023-12-15 RX ORDER — CLINDAMYCIN HYDROCHLORIDE 150 MG/1
450 CAPSULE ORAL 3 TIMES DAILY
Qty: 45 CAPSULE | Refills: 0 | Status: SHIPPED | OUTPATIENT
Start: 2023-12-15 | End: 2023-12-15

## 2023-12-15 RX ORDER — GADOBUTROL 604.72 MG/ML
9 INJECTION INTRAVENOUS
Status: COMPLETED | OUTPATIENT
Start: 2023-12-15 | End: 2023-12-15

## 2023-12-15 RX ORDER — AMLODIPINE BESYLATE 5 MG/1
5 TABLET ORAL DAILY
Qty: 30 TABLET | Refills: 0 | Status: SHIPPED | OUTPATIENT
Start: 2023-12-15 | End: 2023-12-16 | Stop reason: SDUPTHER

## 2023-12-15 RX ORDER — CLINDAMYCIN PHOSPHATE 600 MG/50ML
600 INJECTION, SOLUTION INTRAVENOUS
Status: DISCONTINUED | OUTPATIENT
Start: 2023-12-15 | End: 2023-12-15 | Stop reason: HOSPADM

## 2023-12-15 RX ORDER — CLINDAMYCIN HYDROCHLORIDE 150 MG/1
450 CAPSULE ORAL 3 TIMES DAILY
Qty: 63 CAPSULE | Refills: 0 | Status: SHIPPED | OUTPATIENT
Start: 2023-12-15 | End: 2023-12-22

## 2023-12-15 RX ORDER — KETOROLAC TROMETHAMINE 30 MG/ML
10 INJECTION, SOLUTION INTRAMUSCULAR; INTRAVENOUS
Status: COMPLETED | OUTPATIENT
Start: 2023-12-15 | End: 2023-12-15

## 2023-12-15 RX ORDER — CLINDAMYCIN HYDROCHLORIDE 150 MG/1
450 CAPSULE ORAL
Status: DISCONTINUED | OUTPATIENT
Start: 2023-12-15 | End: 2023-12-15

## 2023-12-15 RX ORDER — CLINDAMYCIN HYDROCHLORIDE 150 MG/1
450 CAPSULE ORAL
Status: COMPLETED | OUTPATIENT
Start: 2023-12-15 | End: 2023-12-15

## 2023-12-15 RX ORDER — ACETAMINOPHEN 500 MG
1000 TABLET ORAL
Status: COMPLETED | OUTPATIENT
Start: 2023-12-15 | End: 2023-12-15

## 2023-12-15 RX ADMIN — CLINDAMYCIN HYDROCHLORIDE 450 MG: 150 CAPSULE ORAL at 03:12

## 2023-12-15 RX ADMIN — KETOROLAC TROMETHAMINE 10 MG: 30 INJECTION, SOLUTION INTRAMUSCULAR; INTRAVENOUS at 01:12

## 2023-12-15 RX ADMIN — ACETAMINOPHEN 1000 MG: 500 TABLET ORAL at 01:12

## 2023-12-15 RX ADMIN — GADOBUTROL 9 ML: 604.72 INJECTION INTRAVENOUS at 02:12

## 2023-12-15 NOTE — ED PROVIDER NOTES
Encounter Date: 12/14/2023       History     Chief Complaint   Patient presents with    Post-op Problem     Had sky placed in L femur 2 months ago and states that he is having trouble walking on that leg for a week. Denies redness, swelling, or drainage from site. Also states that legs have been swollen for 3 weeks     58-year-old male with history of hypertension, IV drug use (last used meth yesterday) who presents to the emergency department with chief complaint of facial pain and hip pain.  His hip pain began about 2 weeks ago.  Located in the left hip and radiates down the left lower extremity.  He is unable to ambulate well, but can still bear weight on the leg.  No numbness or tingling.  He endorses swelling in the lower extremities bilaterally, but worse on the right.  He also complains of facial pain and swelling that began a couple of days ago.  He reports having an abscessed tooth in the past, and symptoms feel similar.  He denies fever.  No nausea or vomiting.  He denies other worsening or alleviating factors.      Review of patient's allergies indicates:  No Known Allergies  Past Medical History:   Diagnosis Date    Arthritis     Hypertension      Past Surgical History:   Procedure Laterality Date    INTRAMEDULLARY RODDING OF FEMUR Left 10/11/2023    Procedure: INSERTION, INTRAMEDULLARY SKY, FEMUR;  Surgeon: Candelario Koenig MD;  Location: [facility] OR [address] [place] [address];  Service: Orthopedics;  Laterality: Left;     No family history on file.  Social History     Tobacco Use    Smoking status: Every Day     Current packs/day: 1.00     Types: Cigarettes   Substance Use Topics    Alcohol use: Yes     Comment: Daily beer and whiskkey    Drug use: Yes     Types: Marijuana, IV     Comment: last use 2 days ago     Review of Systems   HENT:  Positive for facial swelling.    Musculoskeletal:  Positive for arthralgias.       Physical Exam     Initial Vitals [12/14/23 2014]   BP Pulse Resp Temp SpO2   (!) 214/99 73 16 97.7 °F  (36.5 °C) 99 %      MAP       --         Physical Exam    Vitals reviewed.  Constitutional: He appears well-developed and well-nourished. He is not diaphoretic. No distress.   Appears intoxicated    HENT:   Head: Normocephalic and atraumatic.   Mouth/Throat: Oropharynx is clear and moist.   Left sided facial swelling noted. No appreciable area of fluctuance. Multiple dental caries present.    Eyes: EOM are normal. Pupils are equal, round, and reactive to light.   Neck: Neck supple.   Normal range of motion.  Cardiovascular:  Normal rate, regular rhythm, normal heart sounds and intact distal pulses.     Exam reveals no gallop and no friction rub.       No murmur heard.  Pulmonary/Chest: Breath sounds normal. He has no wheezes. He has no rhonchi. He has no rales.   Abdominal: Abdomen is soft. Bowel sounds are normal. There is no abdominal tenderness. There is no rebound and no guarding.   Musculoskeletal:         General: Normal range of motion.      Cervical back: Normal range of motion and neck supple.      Comments: Limited ROM of the hip secondary to pain. No overlying skin changes. Swelling to lower extremities bilaterally, R>L. 2+ distal pulses.      Neurological: He is alert and oriented to person, place, and time. He has normal strength. GCS score is 15. GCS eye subscore is 4. GCS verbal subscore is 5. GCS motor subscore is 6.   Skin: Skin is warm and dry. Capillary refill takes less than 2 seconds.   Psychiatric: He has a normal mood and affect. His behavior is normal. Judgment and thought content normal.         ED Course   Procedures  Labs Reviewed   CBC W/ AUTO DIFFERENTIAL - Abnormal; Notable for the following components:       Result Value    MCHC 31.6 (*)     MPV 8.9 (*)     All other components within normal limits   COMPREHENSIVE METABOLIC PANEL          Imaging Results              X-Ray Hip 2 or 3 views Left (with Pelvis when performed) (In process)                      Medications   ketorolac  injection 9.999 mg (has no administration in time range)     Medical Decision Making  Emergent evaluation of a 58 y.o. male presenting to the emergency department complaining of left hip pain and facial pain/swelling. No systemic symptoms. Patient is afebrile, hemodynamically stable, and non toxic appearing.     Will order labs, imaging, analgesia.   Differential diagnosis includes but isn't limited to postop fracture, postop infection, septic arthritis, abscess, dental abscess, facial cellulitis.    I have consulted orthopedics. They will evaluate the patient.   Lab work and imaging are pending.   Due to shift change, will sign out to supervising physician who will continue to monitor until final disposition is reached.  Discussed with patient.  All questions answered.  The patient's history, physical exam, and plan of care was discussed with and agreed upon with my supervising physician.     Amount and/or Complexity of Data Reviewed  Labs: ordered. Decision-making details documented in ED Course.  Radiology: ordered.    Risk  OTC drugs.  Prescription drug management.               ED Course as of 12/19/23 1056   Fri Dec 15, 2023   0030 Creatinine: 0.8 [BA]      ED Course User Index  [BA] Mervin Guerra PA-C                           Clinical Impression:  Final diagnoses:  [M79.605] Left leg pain (Primary)  [K04.7] Dental infection                 Mel Mcgovern PA-C  12/14/23 7094       Wiliam Crisostomo MD  12/19/23 1055

## 2023-12-15 NOTE — CONSULTS
[facility] - Emergency Dept  Orthopedics  Consult Note    Attg Note:  I agree with the resident's assessment and plan.  Patient missed his first 2 post op appts.  MRI as I would expect it to look 8 weeks after a nail.  Hwr intact.  X rays look good.      Candelario Koenig MD      Patient Name: Ramez Rand  MRN: [id number]  Admission Date: 12/14/2023  Hospital Length of Stay: 0 days  Attending Provider: No att. providers found  Primary Care Provider: No, Primary Doctor    Inpatient consult to Orthopedics  Consult performed by: Yusuf Cedillo MD  Consult ordered by: Steve Rice MD        Subjective:     Principal Problem:Trochanteric bursitis of left hip    Chief Complaint:   Chief Complaint   Patient presents with    Post-op Problem     Had krystal placed in L femur 2 months ago and states that he is having trouble walking on that leg for a week. Denies redness, swelling, or drainage from site. Also states that legs have been swollen for 3 weeks        HPI: Ramez Rand is a 58 y.o. male with PMH significant for IVDU, preferred drug is methamphetamine, and left sided IT fx s/p CMN 10/11 by Dr. Koenig presenting with complaints of pain and tightness over the lateral hip with walking. 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 He states that he has walked and bicycled to and from jobs since surgery and about a week ago he began to develop this pain. He states that he thinks he may have a dental abscess and endorses pain in his tooth as well as swelling. He denies any chest pain, SOB, fevers, chills, or recent sick contact. He last injected 3 days ago. Patient denies any head trauma or LOC. The patient denies prior hx of falls. Patient denies numbness and tingling. Denies any other musculoskeletal pain or injuries..  Walks w/out assisted devices at baseline. Doesn't take any anticoagulation at baseline.  They endorse tobacco use.   They endorse alcohol use with history of alcohol  withdrawal  They deny immunosuppressant medications.  They deny chemotherapy.  They deny radiation therapy.       Past Medical History:   Diagnosis Date    Arthritis     Hypertension        Past Surgical History:   Procedure Laterality Date    INTRAMEDULLARY RODDING OF FEMUR Left 10/11/2023    Procedure: INSERTION, INTRAMEDULLARY SKY, FEMUR;  Surgeon: Candelario Koenig MD;  Location: [facility] OR [address] [place] [address];  Service: Orthopedics;  Laterality: Left;       Review of patient's allergies indicates:  No Known Allergies    Current Facility-Administered Medications   Medication    clindamycin in D5W 600 mg/50 mL IVPB 600 mg     Current Outpatient Medications   Medication Sig    acetaminophen (TYLENOL) 500 MG tablet Take 2 tablets (1,000 mg total) by mouth every 8 (eight) hours.    amLODIPine (NORVASC) 5 MG tablet Take 1 tablet (5 mg total) by mouth once daily.    cholecalciferol, vitamin D3, (VITAMIN D3) 50 mcg (2,000 unit) Tab Take 1 tablet (2,000 Units total) by mouth once daily.    clindamycin (CLEOCIN) 150 MG capsule Take 3 capsules (450 mg total) by mouth 3 (three) times daily. for 7 days    methocarbamoL (ROBAXIN) 500 MG Tab Take 1 tablet (500 mg total) by mouth every 6 (six) hours as needed (pain 1-4/1o pain scale).    oxyCODONE (ROXICODONE) 5 MG immediate release tablet Take 1 tablet (5 mg total) by mouth every 6 (six) hours as needed (pain scale 7-10).    senna-docusate 8.6-50 mg (PERICOLACE) 8.6-50 mg per tablet Take 1 tablet by mouth 2 (two) times daily.     Family History    None       Tobacco Use    Smoking status: Every Day     Current packs/day: 1.00     Types: Cigarettes    Smokeless tobacco: Not on file   Substance and Sexual Activity    Alcohol use: Yes     Comment: Daily beer and whiskkey    Drug use: Yes     Types: Marijuana, IV     Comment: last use 2 days ago    Sexual activity: Not on file     ROS    Constitutional: negative for fevers  Eyes: negative visual changes  ENT: negative for hearing  "loss  Respiratory: negative for dyspnea  Cardiovascular: negative for chest pain  Gastrointestinal: negative for abdominal pain  Genitourinary: negative for dysuria  Neurological: negative for headaches  Behavioral/Psych: negative for hallucinations  Endocrine: negative for temperature intolerance      Objective:     Vital Signs (Most Recent):  Temp: 98.2 °F (36.8 °C) (12/15/23 1106)  Pulse: 71 (12/15/23 1106)  Resp: 18 (12/15/23 1106)  BP: (!) 185/95 (12/15/23 1106)  SpO2: 98 % (12/15/23 1106) Vital Signs (24h Range):  Temp:  [97.7 °F (36.5 °C)-98.5 °F (36.9 °C)] 98.2 °F (36.8 °C)  Pulse:  [65-73] 71  Resp:  [16-20] 18  SpO2:  [97 %-99 %] 98 %  BP: (166-214)/() 185/95           There is no height or weight on file to calculate BMI.    No intake or output data in the 24 hours ending 12/15/23 1511     Ortho/SPM Exam     Gen:  No acute distress, well-developed, well nourished.  CV:  Peripherally well-perfused. 2+ radial pulses, symmetric.  Respiratory:  Normal respiratory effort. No accessory muscle use.   Head/Neck:  Normocephalic.  Atraumatic. Sclera anicteric. TM. Neck supple.  Neuro: CN 2-12 grossly intact. No FND. Awake. Alert. Oriented to person, place, time, and situation.   Abdomen: Soft, NTND.      MSK:  Left Upper Extremity  Inspection  - Skin intact throughout, no open wounds  - No swelling  - No ecchymosis, erythema, or signs of cellulitis  Palpation  - NonTTP throughout, no palpable abnormality   Range of motion  - AROM and PROM of the shoulder, elbow, wrist, and hand intact  Stability  - No evidence of joint dislocation or abnormal laxity   Neurovascular  - AIN/PIN/Radial/Median/Ulnar Nerves assessed in isolation without deficit  - Able to give thumbs up, make "OK" sign, cross IF/LF, abduct/adduct fingers, make fist  - SILT throughout  - Compartments soft  - Radial artery palpated  - Capillary Refill <3s  - Muscle tone normal    Right Upper Extremity  Inspection  - Skin intact throughout, no open " "wounds  - No swelling  - No ecchymosis, erythema, or signs of cellulitis  Palpation  - NonTTP throughout, no palpable abnormality   Range of motion  - AROM and PROM of the shoulder, elbow, wrist, and hand intact  Stability  - No evidence of joint dislocation or abnormal laxity Neurovascular  - AIN/PIN/Radial/Median/Ulnar Nerves assessed in isolation without deficit  - Able to give thumbs up, make "OK" sign, cross IF/LF, abduct/adduct fingers, make fist  - SILT throughout  - Compartments soft  - Radial artery palpated  - Capillary Refill <3s  - Muscle tone normal      Left Lower Extremity  Inspection  - Skin intact throughout, no open wounds  - Incision sites well healed without keloid, erthyma, induration, fluctuance, or dehiscence.  - No swelling  - No ecchymosis, erythema, or signs of cellulitis  Palpation  - Mild TTP over greater trochanter, nontender over anterior pelvis or sacrum.  Range of motion  - AROM and PROM of the hip, knee, ankle, and foot intact and painless  - No pain with axial loading of the leg  Stability  - No evidence of joint dislocation or abnormal laxity  Neurovascular  - TA/EHL/Gastroc/FHL assessed in isolation without deficit  - SILT throughout  - Compartments soft  - DP palpated   - Capillary Refill <3s  - Negative Log roll  - Negative Stinchfield  - Muscle tone normal    Right Lower Extremity  Inspection  - Skin intact throughout, no open wounds  - No swelling  - No ecchymosis, erythema, or signs of cellulitis  Palpation  - NonTTP throughout, no palpable abnormality   Range of motion  - AROM and PROM of the hip, knee, ankle, and foot intact  Stability  - No evidence of joint dislocation or abnormal laxity, Neurovascular  - TA/EHL/Gastroc/FHL assessed in isolation without deficit  - SILT throughout  - Compartments soft  - DP palpated   - Capillary Refill <3s  - Negative Log roll  - Negative Stinchfield  - Muscle tone normal    Spine/pelvis/axial body:  No tenderness to palpation of " cervical, thoracic, or lumbar spine  No pain with compression of pelvis  No chest wall or abdominal tenderness  No decubitus ulcers  Muscle tone normal      Significant Labs: CBC:   Recent Labs   Lab 12/14/23  2112   WBC 11.12   HGB 14.3   HCT 45.2        CRP:   Recent Labs   Lab 12/15/23  0546   CRP 7.4     All pertinent labs within the past 24 hours have been reviewed.    Significant Imaging: X-Ray: I have reviewed all pertinent results/findings and my personal findings are:  XR of left femur shows expected post operative chanages. CMN intact and unchanged in orientation to immediate post operative films. No acute fractures, dislocations, or tumors visualized at this time.   MRI: I have reviewed all pertinent results/findings and my personal findings are:  MRI of the hip shows hyperintense T2 image around previous fracture line likely due to healing, no discrete fluid collection present or signs of infection.   I have reviewed and interpreted all pertinent imaging results/findings.  Assessment/Plan:     * Trochanteric bursitis of left hip  Ramez Rand is a 58 y.o. male with PMH of IVDU and left sided IT fx presenting with trochanteric bursitis for the past week. Patient has WBC WNL, CRP WNL, ESR WNL. VSS, AF. Incision site has healed well without signs of dehiscence, drainage, erythema, or keloid. He has not pain with ROM or axial loading and imaging does not show any discrete fluid collections or acute fracture. He has follow up already scheduled on 01/03/24 in clinic.     Facial swelling and pain concerning for potential dental abscess, recommend emergency staff to assess if necessary for I&D or discharge with abx. No concern for infection of the hip at this time. Pain control per ED, patient instructed to rest, stretch, ice, and elevate left leg when it becomes sore with cycling.         TEVIN KLINE MD  Orthopedics  [facility] - Emergency Dept

## 2023-12-15 NOTE — PROVIDER PROGRESS NOTES - EMERGENCY DEPT.
"Encounter Date: 12/14/2023    ED Physician Progress Notes        Received sign-out from previous team plan was follow-up MRI and reassess patient.  Patient resting comfortably in bed no distress.  MR I obtained.    "Postoperative left proximal femur intramedullary krystal fixation for intertrochanteric fracture with signal changes and enhancement at the greater trochanter of the left hip and mild soft tissue enhancing edema. These findings could represent postoperative granulation and fracture healing, however, coexisting/superimposed osteomyelitis at the fracture site) and surrounding soft tissue infection) cannot be ruled out. "    Patient high-risk given history of IV drug abuse.  Given MRI findings discussed with orthopedic surgery will evaluate patient.  Will plan CRP and sed rate.    "

## 2023-12-15 NOTE — ED NOTES
Pt remains on stretcher,  call bell in reach, bed in low position with brake engaged.  Offers no c/o's at this time. Skin w/d , resp wnl . IV site asymptomatic  and patent. Resting quietly.

## 2023-12-15 NOTE — SUBJECTIVE & OBJECTIVE
Past Medical History:   Diagnosis Date    Arthritis     Hypertension        Past Surgical History:   Procedure Laterality Date    INTRAMEDULLARY RODDING OF FEMUR Left 10/11/2023    Procedure: INSERTION, INTRAMEDULLARY SKY, FEMUR;  Surgeon: Candelario Koenig MD;  Location: [facility] OR [address] [place] [address];  Service: Orthopedics;  Laterality: Left;       Review of patient's allergies indicates:  No Known Allergies    Current Facility-Administered Medications   Medication    clindamycin in D5W 600 mg/50 mL IVPB 600 mg     Current Outpatient Medications   Medication Sig    acetaminophen (TYLENOL) 500 MG tablet Take 2 tablets (1,000 mg total) by mouth every 8 (eight) hours.    amLODIPine (NORVASC) 5 MG tablet Take 1 tablet (5 mg total) by mouth once daily.    cholecalciferol, vitamin D3, (VITAMIN D3) 50 mcg (2,000 unit) Tab Take 1 tablet (2,000 Units total) by mouth once daily.    clindamycin (CLEOCIN) 150 MG capsule Take 3 capsules (450 mg total) by mouth 3 (three) times daily. for 7 days    methocarbamoL (ROBAXIN) 500 MG Tab Take 1 tablet (500 mg total) by mouth every 6 (six) hours as needed (pain 1-4/1o pain scale).    oxyCODONE (ROXICODONE) 5 MG immediate release tablet Take 1 tablet (5 mg total) by mouth every 6 (six) hours as needed (pain scale 7-10).    senna-docusate 8.6-50 mg (PERICOLACE) 8.6-50 mg per tablet Take 1 tablet by mouth 2 (two) times daily.     Family History    None       Tobacco Use    Smoking status: Every Day     Current packs/day: 1.00     Types: Cigarettes    Smokeless tobacco: Not on file   Substance and Sexual Activity    Alcohol use: Yes     Comment: Daily beer and whiskkey    Drug use: Yes     Types: Marijuana, IV     Comment: last use 2 days ago    Sexual activity: Not on file     ROS    Constitutional: negative for fevers  Eyes: negative visual changes  ENT: negative for hearing loss  Respiratory: negative for dyspnea  Cardiovascular: negative for chest pain  Gastrointestinal: negative for abdominal  "pain  Genitourinary: negative for dysuria  Neurological: negative for headaches  Behavioral/Psych: negative for hallucinations  Endocrine: negative for temperature intolerance      Objective:     Vital Signs (Most Recent):  Temp: 98.2 °F (36.8 °C) (12/15/23 1106)  Pulse: 71 (12/15/23 1106)  Resp: 18 (12/15/23 1106)  BP: (!) 185/95 (12/15/23 1106)  SpO2: 98 % (12/15/23 1106) Vital Signs (24h Range):  Temp:  [97.7 °F (36.5 °C)-98.5 °F (36.9 °C)] 98.2 °F (36.8 °C)  Pulse:  [65-73] 71  Resp:  [16-20] 18  SpO2:  [97 %-99 %] 98 %  BP: (166-214)/() 185/95           There is no height or weight on file to calculate BMI.    No intake or output data in the 24 hours ending 12/15/23 1511     Ortho/SPM Exam     Gen:  No acute distress, well-developed, well nourished.  CV:  Peripherally well-perfused. 2+ radial pulses, symmetric.  Respiratory:  Normal respiratory effort. No accessory muscle use.   Head/Neck:  Normocephalic.  Atraumatic. Sclera anicteric. TM. Neck supple.  Neuro: CN 2-12 grossly intact. No FND. Awake. Alert. Oriented to person, place, time, and situation.   Abdomen: Soft, NTND.      MSK:  Left Upper Extremity  Inspection  - Skin intact throughout, no open wounds  - No swelling  - No ecchymosis, erythema, or signs of cellulitis  Palpation  - NonTTP throughout, no palpable abnormality   Range of motion  - AROM and PROM of the shoulder, elbow, wrist, and hand intact  Stability  - No evidence of joint dislocation or abnormal laxity   Neurovascular  - AIN/PIN/Radial/Median/Ulnar Nerves assessed in isolation without deficit  - Able to give thumbs up, make "OK" sign, cross IF/LF, abduct/adduct fingers, make fist  - SILT throughout  - Compartments soft  - Radial artery palpated  - Capillary Refill <3s  - Muscle tone normal    Right Upper Extremity  Inspection  - Skin intact throughout, no open wounds  - No swelling  - No ecchymosis, erythema, or signs of cellulitis  Palpation  - NonTTP throughout, no palpable " "abnormality   Range of motion  - AROM and PROM of the shoulder, elbow, wrist, and hand intact  Stability  - No evidence of joint dislocation or abnormal laxity Neurovascular  - AIN/PIN/Radial/Median/Ulnar Nerves assessed in isolation without deficit  - Able to give thumbs up, make "OK" sign, cross IF/LF, abduct/adduct fingers, make fist  - SILT throughout  - Compartments soft  - Radial artery palpated  - Capillary Refill <3s  - Muscle tone normal      Left Lower Extremity  Inspection  - Skin intact throughout, no open wounds  - Incision sites well healed without keloid, erthyma, induration, fluctuance, or dehiscence.  - No swelling  - No ecchymosis, erythema, or signs of cellulitis  Palpation  - Mild TTP over greater trochanter, nontender over anterior pelvis or sacrum.  Range of motion  - AROM and PROM of the hip, knee, ankle, and foot intact and painless  - No pain with axial loading of the leg  Stability  - No evidence of joint dislocation or abnormal laxity  Neurovascular  - TA/EHL/Gastroc/FHL assessed in isolation without deficit  - SILT throughout  - Compartments soft  - DP palpated   - Capillary Refill <3s  - Negative Log roll  - Negative Stinchfield  - Muscle tone normal    Right Lower Extremity  Inspection  - Skin intact throughout, no open wounds  - No swelling  - No ecchymosis, erythema, or signs of cellulitis  Palpation  - NonTTP throughout, no palpable abnormality   Range of motion  - AROM and PROM of the hip, knee, ankle, and foot intact  Stability  - No evidence of joint dislocation or abnormal laxity, Neurovascular  - TA/EHL/Gastroc/FHL assessed in isolation without deficit  - SILT throughout  - Compartments soft  - DP palpated   - Capillary Refill <3s  - Negative Log roll  - Negative Stinchfield  - Muscle tone normal    Spine/pelvis/axial body:  No tenderness to palpation of cervical, thoracic, or lumbar spine  No pain with compression of pelvis  No chest wall or abdominal tenderness  No decubitus " ulcers  Muscle tone normal      Significant Labs: CBC:   Recent Labs   Lab 12/14/23  2112   WBC 11.12   HGB 14.3   HCT 45.2        CRP:   Recent Labs   Lab 12/15/23  0546   CRP 7.4     All pertinent labs within the past 24 hours have been reviewed.    Significant Imaging: X-Ray: I have reviewed all pertinent results/findings and my personal findings are:  XR of left femur shows expected post operative chanages. CMN intact and unchanged in orientation to immediate post operative films. No acute fractures, dislocations, or tumors visualized at this time.   MRI: I have reviewed all pertinent results/findings and my personal findings are:  MRI of the hip shows hyperintense T2 image around previous fracture line likely due to healing, no discrete fluid collection present or signs of infection.   I have reviewed and interpreted all pertinent imaging results/findings.

## 2023-12-15 NOTE — ED TRIAGE NOTES
Patient reports increased pain and difficulty walking. States had krystal placed in femur, states that a week and a half ago began with difficulty. Also reports swelling to his left face, which he reports is from his teeth.

## 2023-12-15 NOTE — ASSESSMENT & PLAN NOTE
Ramez Rand is a 58 y.o. male with PMH of IVDU and left sided IT fx presenting with trochanteric bursitis for the past week. Patient has WBC WNL, CRP WNL, ESR WNL. VSS, AF. Incision site has healed well without signs of dehiscence, drainage, erythema, or keloid. He has not pain with ROM or axial loading and imaging does not show any discrete fluid collections or acute fracture. He has follow up already scheduled on 01/03/24 in clinic.     Facial swelling and pain concerning for potential dental abscess, recommend emergency staff to assess if necessary for I&D or discharge with abx. No concern for infection of the hip at this time. Pain control per ED, patient instructed to rest, stretch, ice, and elevate left leg when it becomes sore with cycling.

## 2023-12-15 NOTE — PROVIDER PROGRESS NOTES - EMERGENCY DEPT.
I have assumed care for this patient from Dr. Rice         7:18 AM . I have reviewed case, and have seen patient. I have read the chart and discussed care with the previous attending. I agree with the plan as previously determined. Since assuming care, the patient's course includes:      57 yo M with left hip pain and left facial swelling  Pending Ortho consult    Received clinda for facial cellulitis    7:55 AM  Patient is comfortable in the room, reports left hip pain when he walks, causes his leg to give out    Also left facial edema, started yesterday, + multiple cavities, no gingival abscess, + skin induration no fluctuance, received clinda. No WALTER/confusion Discussed that will continue clinda, close monitoring for worsening swelling/pain/HA/confusion.     Discussed w/ Ortho, team is in the OR, it will be a delay in seeing the patient, nobody available from the Ortho team for evaluation in the ED      12:50 PM  Still waiting for orthopedic surgery eval. Requesting pain medication for facial pain. Will also redose clinda. Tylenol, toradol for pain    3:00 PM  Discussed with Orthopedic surgery: no concern for postop infection, patient stable to be discharged     Will discharge the patient on clindamycin, he also has a prescription for amlodipine  The patient has been carefully educated about symptoms and conditions that should prompt immediate return to the ED for recheck or further evaluation. Told to return immediately for any new or worsening or progressive symptoms.

## 2023-12-15 NOTE — DISCHARGE INSTRUCTIONS
Take the prescribed Clindamycin for treatment of your dental infection. Use the below contact information to obtain follow-up with dentistry.     Begin taking the prescribed Norvasc for management of your high blood pressure.    Follow-up with your primary care provider for further evaluation.    Return to the emergency room for new, worsening, or concerning symptoms.     Future Appointments   Date Time Provider Department Center   1/3/2024 10:00 AM Margoth Grey [facility]

## 2023-12-15 NOTE — HPI
Ramez Rand is a 58 y.o. male with PMH significant for IVDU, preferred drug is methamphetamine, and left sided IT fx s/p CMN 10/11 by Dr. Koenig presenting with complaints of pain and tightness over the lateral hip with walking. 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 He states that he has walked and bicycled to and from jobs since surgery and about a week ago he began to develop this pain. He states that he thinks he may have a dental abscess and endorses pain in his tooth as well as swelling. He denies any chest pain, SOB, fevers, chills, or recent sick contact. He last injected 3 days ago. Patient denies any head trauma or LOC. The patient denies prior hx of falls. Patient denies numbness and tingling. Denies any other musculoskeletal pain or injuries..  Walks w/out assisted devices at baseline. Doesn't take any anticoagulation at baseline.  They endorse tobacco use.   They endorse alcohol use with history of alcohol withdrawal  They deny immunosuppressant medications.  They deny chemotherapy.  They deny radiation therapy.

## 2023-12-15 NOTE — PROVIDER PROGRESS NOTES - EMERGENCY DEPT.
Encounter Date: 12/14/2023    ED Physician Progress Notes          Patient signed out to me by my colleague with instructions to follow-up pending work-up. Please see main ED note for previous ED stay documentation. Patient signed out to me with MRI, US, Ortho recommendations pending.    1:00 AM - Mervin Guerra PA-C  U/S and MRI are still pending at the end of my shift. Ortho evaluated at bedside and awaiting MRI for final recommendations. Patient signed out to the on-coming team at the end of my shift. I have discussed the care of this patient with my supervising physician.     ED Diagnosis:  Final diagnoses:  [M79.605] Left leg pain (Primary)  [K04.7] Dental infection  [Z87.81] History of fracture of femur  [R03.0] Elevated blood pressure reading    ED Disposition:

## 2023-12-15 NOTE — FIRST PROVIDER EVALUATION
"Medical screening examination initiated.  I have conducted a focused provider triage encounter, findings are as follows:    Brief history of present illness:  58-year-old male presenting to the ED with left-sided facial swelling and redness.    Vitals:    12/14/23 1523   BP: (!) 214/120  Comment: pt non medically compliant   BP Location: Right arm   Patient Position: Sitting   Pulse: 72   Resp: 20   Temp: 98.5 °F (36.9 °C)   TempSrc: Oral   SpO2: 97%   Weight: 82.6 kg (182 lb)   Height: 6' 3" (1.905 m)       Pertinent physical exam:  Nontoxic.  Nondistressed.  Afebrile.  Hypertension noted.    Brief workup plan:  Full physical examination once in room.    Preliminary workup initiated; this workup will be continued and followed by the physician or advanced practice provider that is assigned to the patient when roomed.  "

## 2023-12-16 ENCOUNTER — TELEPHONE (OUTPATIENT)
Dept: EMERGENCY MEDICINE | Facility: HOSPITAL | Age: 58
End: 2023-12-16
Payer: MEDICAID

## 2023-12-16 RX ORDER — AMLODIPINE BESYLATE 5 MG/1
5 TABLET ORAL DAILY
Qty: 30 TABLET | Refills: 0 | Status: SHIPPED | OUTPATIENT
Start: 2023-12-16 | End: 2023-12-17 | Stop reason: SDUPTHER

## 2023-12-17 RX ORDER — AMLODIPINE BESYLATE 5 MG/1
5 TABLET ORAL DAILY
Qty: 30 TABLET | Refills: 0 | Status: SHIPPED | OUTPATIENT
Start: 2023-12-17 | End: 2024-01-16

## 2023-12-17 NOTE — TELEPHONE ENCOUNTER
Called patient regarding recent ED visit.  He has been taking his clindamycin as prescribed.  He reports improvement with his facial pain and swelling.  He does report that he was unable to fill his amlodipine as he lost the prescription.  I have called in the prescription to his pharmacy of choice.  He confirms that he has follow-up information and will follow up at his upcoming appointments.  All questions answered.

## 2024-01-03 ENCOUNTER — TELEPHONE (OUTPATIENT)
Dept: ORTHOPEDICS | Facility: CLINIC | Age: 59
End: 2024-01-03
Payer: MEDICAID

## 2024-01-03 NOTE — TELEPHONE ENCOUNTER
----- Message from Margi Partida sent at 1/3/2024  9:33 AM CST -----  Regarding: Reschedule  Contact: [phone]  Calling to reschedule appointment on today 01-03-24  due to having no ride. Epic was not showing a appointment to reschedule. Please contact patient as soon as possible.

## 2024-01-03 NOTE — TELEPHONE ENCOUNTER
Called and Informed patient of appointment on 1/10/24 @ 8:15 AM. Patient states verbal understanding and has no further questions.

## 2024-01-09 NOTE — PROGRESS NOTES
Principal Orthopedic Problem:  Left intertrochanteric femur fracture      10/11/23: :  Cephalomedullary nail fixation of left intertrochanteric femur fracture     Mr. Rand is here today for a post-operative visit    Interval History:  he reports that he is doing ok.   he is at home . he is not participating in PT/OT. He is going to work 7 days a week this involves a lot of walking and standing.   Pain is controled.  he is  taking pain medication.  He stated that he has groin pain and has to hesitates with first getting up to walk  he denies fever, chills, and sweats .     Physical exam:    Patient arrives to exam room: walked in no assistive device.  Patient is un accompanied    Dressing taken down.  Incision is clean, dry and intact.   Healing well no signs of breakdown or infection. Full range of motion knee and ankle    RADS: All pertinent images were reviewed by myself:   Postsurgical changes with intramedullary krystal and surgical nails through the neck and head.  Remote fracture of the greater trochanter.  No evidence of acute fracture.  Hardware and alignment are intact.  No joint effusion.  Degenerative changes of the left hip.  No soft tissue abnormality     Assessment:  Post-op visit ( 13 weeks)    Plan:  Current care, treatment plan, precautions, activity level/ modifications, limitations, rehabilitation exercises and proposed future treatment were discussed with the patient. We discussed the need to monitor for changes in symptoms and condition and report them to the physician.  Discussed importance of compliance with all appointments and follow up examinations.     WOUND CARE :  - The patient was advised to keep the incision clean and dry for the next 24 hours after which he may wash the area with antibacterial soap in the shower. Will not submerge until the incision is completely healed  -Patient was advised to monitor wound closely and multiple times daily for any problems. Call clinic  immediately or report to ED for immediate medical attention for any complications including reopening of wound, drainage, purulence, redness, streaking, odor, pain out of proportion, fever, chills, etc.       ACTIVITY:   - as tolerated, no high impact discussed to decrease some activity to allow to recuperate.   -range of motion as tolerated    - WBAT      -PT/OT, hold, declinied, Patient is responsible to establish and continue care      PAIN MEDICATION:   - Multimodal pain control  - Pain medication: refill was not needed  - Pain medication refill policy provided to patient for review, yes.    - Patient was informed a multi-modal approach is used to treat their pain. With the goal to get off of narcotic pain medication and discontinue as soon as possible.   - ice and elevation to reduce pain and swelling    FOLLOW UP:   - Patient will follow up in the clinic in 6 weeks, sooner if any concerns.  - X-ray of his femur is needed.        If there are any questions prior to scheduled follow up, the patient was instructed to contact the office

## 2024-01-10 ENCOUNTER — TELEPHONE (OUTPATIENT)
Dept: ORTHOPEDICS | Facility: CLINIC | Age: 59
End: 2024-01-10
Payer: MEDICAID

## 2024-01-10 ENCOUNTER — HOSPITAL ENCOUNTER (OUTPATIENT)
Dept: RADIOLOGY | Facility: HOSPITAL | Age: 59
Discharge: HOME | End: 2024-01-10
Attending: PHYSICIAN ASSISTANT
Payer: MEDICAID

## 2024-01-10 ENCOUNTER — OFFICE VISIT (OUTPATIENT)
Dept: ORTHOPEDICS | Facility: CLINIC | Age: 59
End: 2024-01-10
Payer: MEDICAID

## 2024-01-10 VITALS — HEIGHT: 75 IN | BODY MASS INDEX: 22.64 KG/M2 | WEIGHT: 182.13 LBS

## 2024-01-10 DIAGNOSIS — M89.8X5 PAIN OF LEFT FEMUR: ICD-10-CM

## 2024-01-10 DIAGNOSIS — M89.8X5 PAIN OF LEFT FEMUR: Primary | ICD-10-CM

## 2024-01-10 PROCEDURE — 73552 X-RAY EXAM OF FEMUR 2/>: CPT | Mod: 26,LT,, | Performed by: RADIOLOGY

## 2024-01-10 PROCEDURE — 99213 OFFICE O/P EST LOW 20 MIN: CPT | Mod: PBBFAC | Performed by: PHYSICIAN ASSISTANT

## 2024-01-10 PROCEDURE — 73552 X-RAY EXAM OF FEMUR 2/>: CPT | Mod: TC,LT

## 2024-01-10 PROCEDURE — 99024 POSTOP FOLLOW-UP VISIT: CPT | Mod: ,,, | Performed by: PHYSICIAN ASSISTANT

## 2024-01-10 PROCEDURE — 99999 PR PBB SHADOW E&M-EST. PATIENT-LVL III: CPT | Mod: PBBFAC,,, | Performed by: PHYSICIAN ASSISTANT

## 2024-01-10 PROCEDURE — 1159F MED LIST DOCD IN RCRD: CPT | Mod: CPTII,,, | Performed by: PHYSICIAN ASSISTANT

## 2024-01-10 NOTE — TELEPHONE ENCOUNTER
Spoke with pt. Pt states that he will be 1hr late. Patient states verbal understanding and has no further questions.

## 2024-02-05 ENCOUNTER — HOSPITAL ENCOUNTER (EMERGENCY)
Facility: HOSPITAL | Age: 59
Discharge: HOME | End: 2024-02-05
Attending: EMERGENCY MEDICINE
Payer: MEDICAID

## 2024-02-05 VITALS
HEIGHT: 75 IN | HEART RATE: 72 BPM | SYSTOLIC BLOOD PRESSURE: 168 MMHG | WEIGHT: 183 LBS | OXYGEN SATURATION: 99 % | RESPIRATION RATE: 18 BRPM | BODY MASS INDEX: 22.75 KG/M2 | TEMPERATURE: 98 F | DIASTOLIC BLOOD PRESSURE: 88 MMHG

## 2024-02-05 DIAGNOSIS — R10.9 FLANK PAIN: Primary | ICD-10-CM

## 2024-02-05 DIAGNOSIS — R05.9 COUGH: ICD-10-CM

## 2024-02-05 LAB
ALBUMIN SERPL BCP-MCNC: 3.3 G/DL (ref 3.5–5.2)
ALP SERPL-CCNC: 93 U/L (ref 55–135)
ALT SERPL W/O P-5'-P-CCNC: 17 U/L (ref 10–44)
ANION GAP SERPL CALC-SCNC: 6 MMOL/L (ref 8–16)
AST SERPL-CCNC: 25 U/L (ref 10–40)
BASOPHILS # BLD AUTO: 0.02 K/UL (ref 0–0.2)
BASOPHILS NFR BLD: 0.2 % (ref 0–1.9)
BILIRUB SERPL-MCNC: 0.3 MG/DL (ref 0.1–1)
BILIRUB UR QL STRIP: NEGATIVE
BUN SERPL-MCNC: 14 MG/DL (ref 6–20)
BUN SERPL-MCNC: 17 MG/DL (ref 6–30)
CALCIUM SERPL-MCNC: 9.3 MG/DL (ref 8.7–10.5)
CHLORIDE SERPL-SCNC: 102 MMOL/L (ref 95–110)
CHLORIDE SERPL-SCNC: 107 MMOL/L (ref 95–110)
CLARITY UR REFRACT.AUTO: CLEAR
CO2 SERPL-SCNC: 25 MMOL/L (ref 23–29)
COLOR UR AUTO: YELLOW
CREAT SERPL-MCNC: 0.8 MG/DL (ref 0.5–1.4)
CREAT SERPL-MCNC: 0.8 MG/DL (ref 0.5–1.4)
DIFFERENTIAL METHOD BLD: ABNORMAL
EOSINOPHIL # BLD AUTO: 0.1 K/UL (ref 0–0.5)
EOSINOPHIL NFR BLD: 1.3 % (ref 0–8)
ERYTHROCYTE [DISTWIDTH] IN BLOOD BY AUTOMATED COUNT: 13.6 % (ref 11.5–14.5)
EST. GFR  (NO RACE VARIABLE): >60 ML/MIN/1.73 M^2
GLUCOSE SERPL-MCNC: 91 MG/DL (ref 70–110)
GLUCOSE SERPL-MCNC: 93 MG/DL (ref 70–110)
GLUCOSE UR QL STRIP: NEGATIVE
HCT VFR BLD AUTO: 40.6 % (ref 40–54)
HCT VFR BLD CALC: 41 %PCV (ref 36–54)
HGB BLD-MCNC: 13.7 G/DL (ref 14–18)
HGB UR QL STRIP: NEGATIVE
IMM GRANULOCYTES # BLD AUTO: 0.04 K/UL (ref 0–0.04)
IMM GRANULOCYTES NFR BLD AUTO: 0.4 % (ref 0–0.5)
INFLUENZA A, MOLECULAR: NEGATIVE
INFLUENZA B, MOLECULAR: NEGATIVE
KETONES UR QL STRIP: NEGATIVE
LEUKOCYTE ESTERASE UR QL STRIP: NEGATIVE
LIPASE SERPL-CCNC: 25 U/L (ref 4–60)
LYMPHOCYTES # BLD AUTO: 2.5 K/UL (ref 1–4.8)
LYMPHOCYTES NFR BLD: 28.3 % (ref 18–48)
MCH RBC QN AUTO: 30.2 PG (ref 27–31)
MCHC RBC AUTO-ENTMCNC: 33.7 G/DL (ref 32–36)
MCV RBC AUTO: 89 FL (ref 82–98)
MONOCYTES # BLD AUTO: 0.7 K/UL (ref 0.3–1)
MONOCYTES NFR BLD: 8.2 % (ref 4–15)
NEUTROPHILS # BLD AUTO: 5.5 K/UL (ref 1.8–7.7)
NEUTROPHILS NFR BLD: 61.6 % (ref 38–73)
NITRITE UR QL STRIP: NEGATIVE
NRBC BLD-RTO: 0 /100 WBC
PH UR STRIP: 6 [PH] (ref 5–8)
PLATELET # BLD AUTO: 356 K/UL (ref 150–450)
PMV BLD AUTO: 9.1 FL (ref 9.2–12.9)
POC IONIZED CALCIUM: 1.15 MMOL/L (ref 1.06–1.42)
POC TCO2 (MEASURED): 28 MMOL/L (ref 23–29)
POTASSIUM BLD-SCNC: 4.2 MMOL/L (ref 3.5–5.1)
POTASSIUM SERPL-SCNC: 4.2 MMOL/L (ref 3.5–5.1)
PROT SERPL-MCNC: 7.1 G/DL (ref 6–8.4)
PROT UR QL STRIP: NEGATIVE
RBC # BLD AUTO: 4.54 M/UL (ref 4.6–6.2)
SAMPLE: NORMAL
SARS-COV-2 RDRP RESP QL NAA+PROBE: NEGATIVE
SODIUM BLD-SCNC: 141 MMOL/L (ref 136–145)
SODIUM SERPL-SCNC: 138 MMOL/L (ref 136–145)
SP GR UR STRIP: 1.03 (ref 1–1.03)
SPECIMEN SOURCE: NORMAL
URN SPEC COLLECT METH UR: NORMAL
WBC # BLD AUTO: 8.95 K/UL (ref 3.9–12.7)

## 2024-02-05 PROCEDURE — 80053 COMPREHEN METABOLIC PANEL: CPT | Performed by: EMERGENCY MEDICINE

## 2024-02-05 PROCEDURE — U0002 COVID-19 LAB TEST NON-CDC: HCPCS | Performed by: EMERGENCY MEDICINE

## 2024-02-05 PROCEDURE — 99285 EMERGENCY DEPT VISIT HI MDM: CPT | Mod: 25

## 2024-02-05 PROCEDURE — 96374 THER/PROPH/DIAG INJ IV PUSH: CPT

## 2024-02-05 PROCEDURE — 83690 ASSAY OF LIPASE: CPT | Performed by: EMERGENCY MEDICINE

## 2024-02-05 PROCEDURE — 81003 URINALYSIS AUTO W/O SCOPE: CPT | Performed by: EMERGENCY MEDICINE

## 2024-02-05 PROCEDURE — 63600175 PHARM REV CODE 636 W HCPCS: Performed by: EMERGENCY MEDICINE

## 2024-02-05 PROCEDURE — 85025 COMPLETE CBC W/AUTO DIFF WBC: CPT | Performed by: EMERGENCY MEDICINE

## 2024-02-05 PROCEDURE — 80048 BASIC METABOLIC PNL TOTAL CA: CPT | Mod: XB

## 2024-02-05 PROCEDURE — 87502 INFLUENZA DNA AMP PROBE: CPT | Performed by: EMERGENCY MEDICINE

## 2024-02-05 RX ORDER — KETOROLAC TROMETHAMINE 30 MG/ML
15 INJECTION, SOLUTION INTRAMUSCULAR; INTRAVENOUS
Status: COMPLETED | OUTPATIENT
Start: 2024-02-05 | End: 2024-02-05

## 2024-02-05 RX ORDER — METHOCARBAMOL 500 MG/1
500 TABLET, FILM COATED ORAL EVERY 6 HOURS PRN
Qty: 60 TABLET | Refills: 1 | Status: SHIPPED | OUTPATIENT
Start: 2024-02-05

## 2024-02-05 RX ORDER — METHOCARBAMOL 500 MG/1
500 TABLET, FILM COATED ORAL 3 TIMES DAILY
Qty: 30 TABLET | Refills: 0 | Status: SHIPPED | OUTPATIENT
Start: 2024-02-05 | End: 2024-02-05 | Stop reason: SDUPTHER

## 2024-02-05 RX ADMIN — KETOROLAC TROMETHAMINE 15 MG: 30 INJECTION, SOLUTION INTRAMUSCULAR; INTRAVENOUS at 01:02

## 2024-02-05 NOTE — FIRST PROVIDER EVALUATION
Medical screening examination initiated.  I have conducted a focused provider triage encounter, findings are as follows:    Brief history of present illness:  left abdomen/flank pain for a week, was waxing/waning but now constant.  No urinary symptoms. Also with chest congestion, productive cough.    There were no vitals filed for this visit.    Pertinent physical exam:  doesn't want to sit because of pain, stands comfortably    Brief workup plan:  labs, swabs    Preliminary workup initiated; this workup will be continued and followed by the physician or advanced practice provider that is assigned to the patient when roomed.

## 2024-02-05 NOTE — ED PROVIDER NOTES
Encounter Date: 2/5/2024       History     Chief Complaint   Patient presents with    Flank Pain     L side and lower L abd pain ,     URI       58-year-old male presents with aching pain to his left flank.  It radiates to his lower abdomen.  It is coming and going for the last week.  Denies any dysuria fever or vomiting.  Had a recent femur operation that is healing well.  No chest pain or leg swelling.  He has had a nonproductive cough for the last week.        Review of patient's allergies indicates:  No Known Allergies  Past Medical History:   Diagnosis Date    Arthritis     Hypertension      Past Surgical History:   Procedure Laterality Date    INTRAMEDULLARY RODDING OF FEMUR Left 10/11/2023    Procedure: INSERTION, INTRAMEDULLARY SKY, FEMUR;  Surgeon: Candelario Koenig MD;  Location: [facility] OR [address] [place] [address];  Service: Orthopedics;  Laterality: Left;     History reviewed. No pertinent family history.  Social History     Tobacco Use    Smoking status: Every Day     Current packs/day: 1.00     Types: Cigarettes   Substance Use Topics    Alcohol use: Yes     Comment: Daily beer and whiskkey    Drug use: Yes     Types: Marijuana, IV     Comment: last use 2 days ago     Review of Systems    Physical Exam     Initial Vitals [02/05/24 1137]   BP Pulse Resp Temp SpO2   (!) 196/113 83 18 98.3 °F (36.8 °C) 99 %      MAP       --         Physical Exam    Constitutional: He appears well-developed and well-nourished. No distress.   HENT:   Head: Normocephalic and atraumatic.   Mouth/Throat: Oropharynx is clear and moist.   Eyes: Conjunctivae and EOM are normal. Pupils are equal, round, and reactive to light. Right eye exhibits no discharge. Left eye exhibits no discharge. No scleral icterus.   Neck: Neck supple. No JVD present.   Normal range of motion.  Cardiovascular:  Normal rate, regular rhythm, normal heart sounds and intact distal pulses.     Exam reveals no friction rub.       No murmur heard.  Pulmonary/Chest: Breath  sounds normal. No stridor. No respiratory distress. He has no wheezes. He has no rhonchi. He has no rales.   Abdominal: Abdomen is soft. Bowel sounds are normal. He exhibits no distension and no mass. There is no abdominal tenderness. There is no rebound and no guarding.   Musculoskeletal:         General: No tenderness or edema. Normal range of motion.      Cervical back: Normal range of motion and neck supple.     Lymphadenopathy:     He has no cervical adenopathy.   Neurological: He is alert. He has normal strength. No cranial nerve deficit or sensory deficit. GCS score is 15. GCS eye subscore is 4. GCS verbal subscore is 5. GCS motor subscore is 6.   Skin: Skin is warm. Capillary refill takes less than 2 seconds. No rash noted.   Psychiatric: He has a normal mood and affect.         ED Course   Procedures  Labs Reviewed   CBC W/ AUTO DIFFERENTIAL - Abnormal; Notable for the following components:       Result Value    RBC 4.54 (*)     Hemoglobin 13.7 (*)     MPV 9.1 (*)     All other components within normal limits   COMPREHENSIVE METABOLIC PANEL - Abnormal; Notable for the following components:    Albumin 3.3 (*)     Anion Gap 6 (*)     All other components within normal limits   INFLUENZA A & B BY MOLECULAR   LIPASE   URINALYSIS, REFLEX TO URINE CULTURE    Narrative:     Specimen Source->Urine   SARS-COV-2 RNA AMPLIFICATION, QUAL   ISTAT PROCEDURE   ISTAT CHEM8          Imaging Results               CT Renal Stone Study ABD Pelvis WO (Final result)  Result time 02/05/24 15:52:16      Final result by Kody Macario MD (02/05/24 15:52:16)                   Impression:      This report was flagged in Epic as abnormal.    1. No findings to suggest obstructive uropathy.  2. Moderate to large amount of stool in the colon may reflect constipation.  This may account for slow flow through the small bowel however correlation with any history of enteritis as warranted.  3. Surgical changes of the proximal left femur.   There are regions of either chronic nonunion or acute fracture, correlation with patient history advised.  4. Please see above for several additional findings.      Electronically signed by: Kody Macario MD  Date:    02/05/2024  Time:    15:52               Narrative:    EXAMINATION:  CT RENAL STONE STUDY ABD PELVIS WO    CLINICAL HISTORY:  Flank pain, kidney stone suspected;    TECHNIQUE:  Low dose axial images, sagittal and coronal reformations were obtained from the lung bases to the pubic symphysis.  Contrast was not administered.    COMPARISON:  None    FINDINGS:  Images of the lower thorax are unremarkable.    There are a few calcified granulomas within the right hepatic lobe.  Otherwise, the liver, pancreas, gallbladder and adrenal glands have a grossly unremarkable noncontrast appearance.  There is a subcentimeter low attenuating lesion within the periphery of the spleen noting wall calcification, nonspecific.  There is no biliary dilation or ascites.  No significant abdominal lymphadenopathy.  The stomach is decompressed without wall thickening.    There is no hydronephrosis or nephrolithiasis.  There is a low attenuating lesion arising from the interpolar region of the left kidney measuring 3.2 cm, attenuation of which suggests cyst.  Additional subcentimeter low attenuating lesions arise from the right kidney, too small for characterization.  The bilateral ureters are unremarkable without calculi seen.  The urinary bladder is unremarkable.  The prostate is prominent.    There is a large amount of stool in the rectum.  There is moderate to large amount of stool throughout the remainder of the colon.  The terminal ileum is unremarkable.  The appendix is not confidently identified, no pericecal inflammation.  There is wall thickening of a few scattered jejunal loops, may be on the basis of nondistention.  No pneumatosis.  There are several scattered shotty periaortic, pericaval, and mesenteric lymph  nodes.  No focal organized pelvic fluid collection.    There is surgical change of intramedullary krystal and nail construct of the proximal left femur noting regions of nonunion.  There are degenerative changes of the sacroiliac joints.  There are degenerative changes of the spine.  No significant inguinal lymphadenopathy.                                       X-Ray Chest AP Portable (Final result)  Result time 02/05/24 13:36:33      Final result by Eren Gramajo MD (02/05/24 13:36:33)                   Impression:      See above      Electronically signed by: Eren Gramajo MD  Date:    02/05/2024  Time:    13:36               Narrative:    EXAMINATION:  XR CHEST AP PORTABLE    CLINICAL HISTORY:  Cough, unspecified    TECHNIQUE:  Single frontal view of the chest was performed.    COMPARISON:  10/11/2023    FINDINGS:  Cardiac size is normal.  Lungs are clear.  No infiltrate is seen.                                       Medications   ketorolac injection 15 mg (15 mg Intravenous Given 2/5/24 1341)     Medical Decision Making    Patient with  left flank  pain.  Will consider renal colic and obtain a CT.  Could be musculoskeletal.  Patient with chest congestion cough.  Considered COVID but that is negative.  Will check a chest x-ray look for signs of pneumonia.      Chest x-ray reveals no infiltrate.  CT showed constipation.  I suspect the findings on the femur are from acute fracture.  He has been followed by Orthopedics.  Do not believe emergent Orthopedics consult or dedicated femur in imaging is indicated since he has not having new complaints there.  Will discharge home with Robaxin as muscle relaxant.    Amount and/or Complexity of Data Reviewed  Radiology: ordered.    Risk  Prescription drug management.                                      Clinical Impression:  Final diagnoses:  [R05.9] Cough  [R10.9] Flank pain (Primary)          ED Disposition Condition    Discharge Stable          ED Prescriptions        Medication Sig Dispense Start Date End Date Auth. Provider    methocarbamoL (ROBAXIN) 500 MG Tab  (Status: Discontinued) Take 1 tablet (500 mg total) by mouth 3 (three) times daily. for 10 days 30 tablet 2/5/2024 2/5/2024 Rosendo Kimble MD    methocarbamoL (ROBAXIN) 500 MG Tab Take 1 tablet (500 mg total) by mouth every 6 (six) hours as needed (pain 1-4/1o pain scale). 60 tablet 2/5/2024 -- Rosendo Kimble MD          Follow-up Information       Follow up With Specialties Details Why Contact Info Additional Information    Your Primary Care Physician  Schedule an appointment as soon as possible for a visit in 2 days       [facility] - Emergency Dept Emergency Medicine  If symptoms worsen [address] [facility] [address] [phone]     [facility] - Orthopedics [facility]   [address] [facility] [address] [facility] [address] [phone] [facility] - Main Building, 5th Floor Please park in [facility] and take Atrium elevator             Rosendo Kimble MD  02/05/24 4590

## 2024-02-05 NOTE — ED TRIAGE NOTES
Ramez Rand, a 58 y.o. male presents to the ED w/ complaint of ABD and Left flank pain.     Pt stated that he had surgery Dec. 23rd on his left leg and now has ABD pain and left flank pain.     Triage note:  Chief Complaint   Patient presents with    Flank Pain     L side and lower L abd pain ,     URI     Review of patient's allergies indicates:  No Known Allergies  Past Medical History:   Diagnosis Date    Arthritis     Hypertension      Patient identifiers verified and correct for Ramez Rand  C/C: ABD pain, and left back pain.   APPEARANCE: awake and alert in NAD. PAIN  8/10  SKIN: warm, dry and intact. No breakdown or bruising.  MUSCULOSKELETAL: Patient moving all extremities spontaneously, no obvious swelling or deformities noted. Ambulates independently. + left flank pain.  RESPIRATORY: Denies shortness of breath.Respirations unlabored.   CARDIAC: Denies CP, 2+ distal pulses; no peripheral edema  ABDOMEN:  + ABD pain.   : voids spontaneously, denies difficulty  Neurologic: AAO x 4; follows commands equal strength in all extremities; denies numbness/tingling. Denies dizziness

## 2024-09-01 ENCOUNTER — HOSPITAL ENCOUNTER (EMERGENCY)
Facility: HOSPITAL | Age: 59
Discharge: HOME | End: 2024-09-01
Attending: EMERGENCY MEDICINE
Payer: MEDICAID

## 2024-09-01 VITALS
OXYGEN SATURATION: 98 % | RESPIRATION RATE: 16 BRPM | HEIGHT: 75 IN | TEMPERATURE: 98 F | SYSTOLIC BLOOD PRESSURE: 168 MMHG | DIASTOLIC BLOOD PRESSURE: 83 MMHG | BODY MASS INDEX: 23.38 KG/M2 | HEART RATE: 56 BPM | WEIGHT: 188 LBS

## 2024-09-01 DIAGNOSIS — F10.920 ALCOHOLIC INTOXICATION WITHOUT COMPLICATION: ICD-10-CM

## 2024-09-01 DIAGNOSIS — Z87.19 HISTORY OF INGUINAL HERNIA: Primary | ICD-10-CM

## 2024-09-01 LAB
BILIRUB UR QL STRIP: NEGATIVE
CLARITY UR REFRACT.AUTO: CLEAR
COLOR UR AUTO: YELLOW
GLUCOSE UR QL STRIP: NEGATIVE
HGB UR QL STRIP: NEGATIVE
KETONES UR QL STRIP: NEGATIVE
LEUKOCYTE ESTERASE UR QL STRIP: NEGATIVE
NITRITE UR QL STRIP: NEGATIVE
OHS QRS DURATION: 100 MS
OHS QTC CALCULATION: 484 MS
PH UR STRIP: 6 [PH] (ref 5–8)
PROT UR QL STRIP: ABNORMAL
SP GR UR STRIP: >1.03 (ref 1–1.03)
URN SPEC COLLECT METH UR: ABNORMAL

## 2024-09-01 PROCEDURE — 99283 EMERGENCY DEPT VISIT LOW MDM: CPT

## 2024-09-01 PROCEDURE — 81003 URINALYSIS AUTO W/O SCOPE: CPT | Performed by: EMERGENCY MEDICINE

## 2024-09-01 NOTE — ED PROVIDER NOTES
History:  Ramez Rand is a 59 y.o. male who presents to the ED with Inguinal Hernia (Pt states inguinal hernia on this right side groin area for several months. Pt states pain is worse tonight than normal. Pt states being seen before for same. Denies problems urinating)    Described as 59-year-old male with a history of hypertension presenting to the emergency department with concern for hernia pain.  He reports he developed an inguinal hernia 2-3 months ago that he was always able to put back in, but tonight he had increased pain and was unable to put it back in.  Since arriving to the emergency room, he reports his hernia must have gone back in in his pain has resolved.  He endorses drinking alcohol just prior to arrival.  Also endorses dysuria.    Review of Systems: All systems reviewed and are negative except as per history of present illness.    Medications:   Previous Medications    ACETAMINOPHEN (TYLENOL) 500 MG TABLET    Take 2 tablets (1,000 mg total) by mouth every 8 (eight) hours.    AMLODIPINE (NORVASC) 5 MG TABLET    Take 1 tablet (5 mg total) by mouth once daily.    CHOLECALCIFEROL, VITAMIN D3, (VITAMIN D3) 50 MCG (2,000 UNIT) TAB    Take 1 tablet (2,000 Units total) by mouth once daily.    METHOCARBAMOL (ROBAXIN) 500 MG TAB    Take 1 tablet (500 mg total) by mouth every 6 (six) hours as needed (pain 1-4/1o pain scale).    OXYCODONE (ROXICODONE) 5 MG IMMEDIATE RELEASE TABLET    Take 1 tablet (5 mg total) by mouth every 6 (six) hours as needed (pain scale 7-10).    SENNA-DOCUSATE 8.6-50 MG (PERICOLACE) 8.6-50 MG PER TABLET    Take 1 tablet by mouth 2 (two) times daily.       PMH:   Past Medical History:   Diagnosis Date    Arthritis     Hypertension      PSH:   Past Surgical History:   Procedure Laterality Date    INTRAMEDULLARY RODDING OF FEMUR Left 10/11/2023    Procedure: INSERTION, INTRAMEDULLARY SKY, FEMUR;  Surgeon: Candelario Koenig MD;  Location: [facility] OR [address] [place] [address];  Service: Orthopedics;   "Laterality: Left;     Allergies: He has No Known Allergies.  Social History: Marital Status: unknown. He  reports that he has been smoking cigarettes. He does not have any smokeless tobacco history on file.. He  reports current alcohol use..       Exam:  VITAL SIGNS:   Vitals:    24 0347 24 0408 24 0602 24 0605   BP: (!) 143/69  (!) 182/96 (!) 168/83   BP Location: Left arm   Left arm   Patient Position: Lying   Lying   Pulse: 63  (!) 55 (!) 56   Resp: 18  16 16   Temp: 97 °F (36.1 °C)   98 °F (36.7 °C)   TempSrc: Oral   Oral   SpO2: 96%  99% 98%   Weight:  85.3 kg (188 lb)     Height:  6' 3" (1.905 m)       Const: Awake and alert, NAD   Head: Atraumatic  Eyes: Normal Conjunctiva  ENT: Normal External Ears, Nose and Mouth.  Neck: Full range of motion. No meningismus.  Resp: Normal respiratory effort, No distress  Cardio: Equal and intact distal pulses  Abd: Soft, non tender, non distended.   Exam:  Scrotum: bilaterally slightly enlarged, no hernia, no erythema or skin changes.   Hernia: None  Testes/Epid: nontender with normal lie  Lymph: No inguinal LAD  Discharge: none    Skin: No petechiae or rashes  Ext: No cyanosis, or edema  Neur: Awake and alert  Psych: Normal Mood and Affect    Data:  Results for orders placed or performed during the hospital encounter of 24   Urinalysis, Reflex to Urine Culture Urine, Clean Catch    Specimen: Urine   Result Value Ref Range    Specimen UA Urine, Clean Catch     Color, UA Yellow Yellow, Straw, Araceli    Appearance, UA Clear Clear    pH, UA 6.0 5.0 - 8.0    Specific Gravity, UA >1.030 (A) 1.005 - 1.030    Protein, UA Trace (A) Negative    Glucose, UA Negative Negative    Ketones, UA Negative Negative    Bilirubin (UA) Negative Negative    Occult Blood UA Negative Negative    Nitrite, UA Negative Negative    Leukocytes, UA Negative Negative       Labs & Imaging studies were reviewed independently by me.     Medical Decision Makin-year-old " male presenting to the emergency department with concern for pain in his inguinal hernia.  His pain in hernia had resolved prior to my evaluation.  He has no testicular pain, no abdominal pain, no acute symptoms, though he does endorse dysuria.  No penile discharge my evaluation.  UA is negative for infection.  He was no longer clinically intoxicated.  Ambulatory with a steady gait.  He was stable for discharge home with outpatient follow up.    Clinical Impression:  1. History of inguinal hernia    2. Alcoholic intoxication without complication             Medication List        ASK your doctor about these medications      acetaminophen 500 MG tablet  Commonly known as: TYLENOL  Take 2 tablets (1,000 mg total) by mouth every 8 (eight) hours.     amLODIPine 5 MG tablet  Commonly known as: NORVASC  Take 1 tablet (5 mg total) by mouth once daily.     cholecalciferol (vitamin D3) 50 mcg (2,000 unit) Tab  Commonly known as: VITAMIN D3  Take 1 tablet (2,000 Units total) by mouth once daily.     methocarbamoL 500 MG Tab  Commonly known as: ROBAXIN  Take 1 tablet (500 mg total) by mouth every 6 (six) hours as needed (pain 1-4/1o pain scale).     oxyCODONE 5 MG immediate release tablet  Commonly known as: ROXICODONE  Take 1 tablet (5 mg total) by mouth every 6 (six) hours as needed (pain scale 7-10).     senna-docusate 8.6-50 mg 8.6-50 mg per tablet  Commonly known as: PERICOLACE  Take 1 tablet by mouth 2 (two) times daily.              Follow-up Information       [facility]    Specialty: Surgery  Contact information:  [address]  [facility] [address] [phone]  Additional information:  [facility] - Main Building, 2nd Floor   Please park in [facility] and use escalator or Clinic elevator                             Belle Rossi MD  09/01/24 [id number]

## 2024-09-01 NOTE — ED NOTES
Nurses Note -- 4 Eyes      9/1/2024   4:12 AM      Skin assessed during: Admit      [] No Altered Skin Integrity Present    []Prevention Measures Documented      [x] Yes- Altered Skin Integrity Present or Discovered   [x] LDA Added if Not in Epic (Describe Wound)   [x] New Altered Skin Integrity was Present on Admit and Documented in LDA   [x] Wound Image Taken    Wound Care Consulted? Yes    Attending Nurse:  Siobhan May RN     Second RN/Staff Member:  ABEL Kidd

## 2024-09-01 NOTE — ED NOTES
Pt identifiers Ramez Vickerschecked and correct  LOC: The patient easily awake by voice, alert,sleepy at this time. Oriented x3.  APPEARANCE: Pt resting comfortably, in no acute distress, pt is clean and well groomed, clothing properly fastened  SKIN: Skin warm, dry and intact, normal skin turgor, moist mucus membranes  RESPIRATORY: Airway is open and patent, respirations are spontaneous, even and unlabored, normal effort and rate  CARDIAC: Normal rate and rhythm, no peripheral edema noted, capillary refill < 3 seconds, bilateral radial pulses 2+  ABDOMEN: Soft, nontender, nondistended. Bowel sounds present   NEUROLOGIC: PERRL, facial expression is symmetrical, patient moving all extremities spontaneously, normal sensation in all extremities when touched with a finger.  Follows all commands appropriately  MUSCULOSKELETAL: No obvious deformities.

## 2024-09-01 NOTE — ED TRIAGE NOTES
Ramez Rand, a 59 y.o. male presents to the ED w/ complaint of  inguinal hernia,reported 2/10 pain.Patient denies any SOB,chest pain,chill,fever at this time.    Triage note:  Chief Complaint   Patient presents with    Inguinal Hernia     Pt states inguinal hernia on this right side groin area for several months. Pt states pain is worse tonight than normal. Pt states being seen before for same. Denies problems urinating     Review of patient's allergies indicates:  No Known Allergies  Past Medical History:   Diagnosis Date    Arthritis     Hypertension

## (undated) DEVICE — KIT PT CARE HANA PROFX SSXT

## (undated) DEVICE — DRAPE INCISE IOBAN 2 23X17IN

## (undated) DEVICE — SUT VICRYL PLUS 0 CT1 18IN

## (undated) DEVICE — DRAPE C-ARMOR EQUIPMENT COVER

## (undated) DEVICE — DRESSING AQUACEL RIBBON 2X45CM

## (undated) DEVICE — Device

## (undated) DEVICE — SUT MONOCRYL 3-0 PS-2 UND

## (undated) DEVICE — TAPE SURG DURAPORE 2 X10YD

## (undated) DEVICE — SUT VICRYL PLUS 3-0 SH 18IN

## (undated) DEVICE — TRAY MINOR ORTHO OMC

## (undated) DEVICE — REAMER STEPPED DHS DCS

## (undated) DEVICE — SPONGE GAUZE 4X4 12 PLY STRL

## (undated) DEVICE — DRAPE TOP 53X102IN

## (undated) DEVICE — ADHESIVE DERMABOND ADVANCED

## (undated) DEVICE — DRAPE THREE-QTR REINF 53X77IN

## (undated) DEVICE — DRESSING AQUACEL AG RBBN 2X45

## (undated) DEVICE — DRAPE U SPLIT SHEET 54X76IN

## (undated) DEVICE — DRAPE STERI U-SHAPED 47X51IN

## (undated) DEVICE — BIT DRILL QC 3FLUTED 4.2X145 S

## (undated) DEVICE — WIRE GUIDE 3.2MM 400MM
Type: IMPLANTABLE DEVICE | Site: FEMUR | Status: NON-FUNCTIONAL
Removed: 2023-10-11

## (undated) DEVICE — DRAPE C-ARM ELAS CLIP 42X120IN

## (undated) DEVICE — SPONGE COTTON TRAY 4X4IN

## (undated) DEVICE — APPLICATOR CHLORAPREP ORN 26ML

## (undated) DEVICE — DRAPE IOBAN 2 STERI

## (undated) DEVICE — GOWN AERO CHROME W/ TOWEL XL

## (undated) DEVICE — BIT DRILL CANN TAPERED 10MM